# Patient Record
Sex: FEMALE | Race: WHITE | NOT HISPANIC OR LATINO
[De-identification: names, ages, dates, MRNs, and addresses within clinical notes are randomized per-mention and may not be internally consistent; named-entity substitution may affect disease eponyms.]

---
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
## 2019-02-12 ENCOUNTER — APPOINTMENT (OUTPATIENT)
Dept: SPINE | Facility: CLINIC | Age: 19
End: 2019-02-12
Payer: COMMERCIAL

## 2019-02-12 PROCEDURE — 99203 OFFICE O/P NEW LOW 30 MIN: CPT

## 2019-02-15 VITALS — HEIGHT: 63 IN | WEIGHT: 120 LBS | BODY MASS INDEX: 21.26 KG/M2

## 2019-02-15 NOTE — REASON FOR VISIT
[New Patient Visit] : a new patient visit [Parent] : parent [Referred By: _________] : Patient was referred by ANA [FreeTextEntry1] : Thoracic syrinx

## 2019-02-15 NOTE — HISTORY OF PRESENT ILLNESS
[> 3 months] : more  than 3 months [FreeTextEntry1] : Thoracic syrinx [de-identified] : This is a 18 year old female who presents with a history of back pain - mid thoracic pain- and most recently she had an MRI of the thoracic pain.  A syrinx is noted in the thoracic region.  She denies trauma.  She does not suffer from headaches.  Her extremities are reported with no weakness.

## 2019-02-15 NOTE — ASSESSMENT
[FreeTextEntry1] : Assess:\par Thoracic syrinx\par \par PLAN:\par Rule out etiology \par Brain MRI for rule out of chiari malformation \par MRI of cervical spine to rule out extended syrinx\par MRI of lumbar spine to rule out tethered cord or syrinx\par Scoliosis film of xrays\par Return after images

## 2019-02-19 ENCOUNTER — TRANSCRIPTION ENCOUNTER (OUTPATIENT)
Age: 19
End: 2019-02-19

## 2019-02-25 ENCOUNTER — RESULT REVIEW (OUTPATIENT)
Age: 19
End: 2019-02-25

## 2019-02-26 ENCOUNTER — APPOINTMENT (OUTPATIENT)
Dept: SPINE | Facility: CLINIC | Age: 19
End: 2019-02-26

## 2019-03-05 ENCOUNTER — APPOINTMENT (OUTPATIENT)
Dept: OBGYN | Facility: CLINIC | Age: 19
End: 2019-03-05
Payer: COMMERCIAL

## 2019-03-05 VITALS
HEIGHT: 63 IN | SYSTOLIC BLOOD PRESSURE: 119 MMHG | DIASTOLIC BLOOD PRESSURE: 76 MMHG | WEIGHT: 110 LBS | BODY MASS INDEX: 19.49 KG/M2

## 2019-03-05 DIAGNOSIS — N92.1 EXCESSIVE AND FREQUENT MENSTRUATION WITH IRREGULAR CYCLE: ICD-10-CM

## 2019-03-05 PROCEDURE — 99213 OFFICE O/P EST LOW 20 MIN: CPT

## 2019-03-14 ENCOUNTER — APPOINTMENT (OUTPATIENT)
Dept: OBGYN | Facility: CLINIC | Age: 19
End: 2019-03-14

## 2019-03-17 ENCOUNTER — TRANSCRIPTION ENCOUNTER (OUTPATIENT)
Age: 19
End: 2019-03-17

## 2019-03-19 ENCOUNTER — APPOINTMENT (OUTPATIENT)
Dept: RADIOLOGY | Facility: CLINIC | Age: 19
End: 2019-03-19

## 2019-03-22 ENCOUNTER — OUTPATIENT (OUTPATIENT)
Dept: OUTPATIENT SERVICES | Facility: HOSPITAL | Age: 19
LOS: 1 days | End: 2019-03-22
Payer: COMMERCIAL

## 2019-03-22 ENCOUNTER — APPOINTMENT (OUTPATIENT)
Dept: RADIOLOGY | Facility: CLINIC | Age: 19
End: 2019-03-22
Payer: COMMERCIAL

## 2019-03-22 DIAGNOSIS — G95.0 SYRINGOMYELIA AND SYRINGOBULBIA: ICD-10-CM

## 2019-03-22 PROCEDURE — 72082 X-RAY EXAM ENTIRE SPI 2/3 VW: CPT | Mod: 26

## 2019-03-22 PROCEDURE — 72082 X-RAY EXAM ENTIRE SPI 2/3 VW: CPT

## 2019-03-31 ENCOUNTER — RX RENEWAL (OUTPATIENT)
Age: 19
End: 2019-03-31

## 2019-05-07 ENCOUNTER — APPOINTMENT (OUTPATIENT)
Dept: SPINE | Facility: CLINIC | Age: 19
End: 2019-05-07
Payer: COMMERCIAL

## 2019-05-07 VITALS
BODY MASS INDEX: 22.08 KG/M2 | SYSTOLIC BLOOD PRESSURE: 107 MMHG | DIASTOLIC BLOOD PRESSURE: 70 MMHG | WEIGHT: 120 LBS | HEIGHT: 62 IN

## 2019-05-07 PROCEDURE — 99213 OFFICE O/P EST LOW 20 MIN: CPT

## 2019-05-08 NOTE — DATA REVIEWED
[de-identified] : MRI of brain, cervical and lumbar from  2/21/2019 [de-identified] : X rays Scoliosis from  2/21/2019

## 2019-05-08 NOTE — ASSESSMENT
[FreeTextEntry1] : Assess: Thoracic syrinx MRI show no signs of Chiari Malformation, syrinx does not descend into other parts of spine, and no tethered cord.  No lesions.   PLAN: Surgical options not recommended Recommend PT SCS discussed  Conservative follow up recommended and to have image in one year

## 2019-05-08 NOTE — DATA REVIEWED
[de-identified] : MRI of brain, cervical and lumbar from  2/21/2019 [de-identified] : X rays Scoliosis from  2/21/2019

## 2019-05-08 NOTE — REASON FOR VISIT
[Parent] : parent [Follow-Up: _____] : a [unfilled] follow-up visit [FreeTextEntry1] : 18 year old female with mid thoracic pain and diagnosed with a thoracic syrinx.  She has no changes since her last visit and neuroaxis images will be reviewed at today's visit.

## 2019-08-27 ENCOUNTER — APPOINTMENT (OUTPATIENT)
Dept: OBGYN | Facility: CLINIC | Age: 19
End: 2019-08-27

## 2019-09-16 ENCOUNTER — RX RENEWAL (OUTPATIENT)
Age: 19
End: 2019-09-16

## 2019-09-18 ENCOUNTER — MEDICATION RENEWAL (OUTPATIENT)
Age: 19
End: 2019-09-18

## 2020-02-12 ENCOUNTER — APPOINTMENT (OUTPATIENT)
Dept: SPINE | Facility: CLINIC | Age: 20
End: 2020-02-12
Payer: COMMERCIAL

## 2020-02-12 VITALS
BODY MASS INDEX: 22.08 KG/M2 | DIASTOLIC BLOOD PRESSURE: 76 MMHG | WEIGHT: 120 LBS | HEIGHT: 62 IN | SYSTOLIC BLOOD PRESSURE: 112 MMHG

## 2020-02-12 DIAGNOSIS — G95.0 SYRINGOMYELIA AND SYRINGOBULBIA: ICD-10-CM

## 2020-02-12 DIAGNOSIS — R35.0 FREQUENCY OF MICTURITION: ICD-10-CM

## 2020-02-12 PROCEDURE — 99213 OFFICE O/P EST LOW 20 MIN: CPT

## 2020-02-13 PROBLEM — R35.0 URINARY FREQUENCY: Status: ACTIVE | Noted: 2020-02-12

## 2020-02-13 NOTE — PHYSICAL EXAM
[General Appearance - Alert] : alert [Cranial Nerves Oculomotor (III)] : extraocular motion intact [General Appearance - In No Acute Distress] : in no acute distress [Cranial Nerves Optic (II)] : visual acuity intact bilaterally,  pupils equal round and reactive to light [Cranial Nerves Trigeminal (V)] : facial sensation intact symmetrically [Cranial Nerves Facial (VII)] : face symmetrical [Cranial Nerves Vestibulocochlear (VIII)] : hearing was intact bilaterally [Cranial Nerves Glossopharyngeal (IX)] : tongue and palate midline [Cranial Nerves Accessory (XI - Cranial And Spinal)] : head turning and shoulder shrug symmetric [Motor Tone] : muscle tone was normal in all four extremities [Cranial Nerves Hypoglossal (XII)] : there was no tongue deviation with protrusion [Motor Strength] : muscle strength was normal in all four extremities [Sensation Tactile Decrease] : light touch was intact [Involuntary Movements] : no involuntary movements were seen [Balance] : balance was intact [No Visual Abnormalities] : no visible abnormailities [Normal] : normal [Able to toe walk] : the patient was able to toe walk [Able to heel walk] : the patient was able to heel walk [Intact] : all sensory within normal limits bilaterally [Abdomen Soft] : soft [] : no respiratory distress [Skin Color & Pigmentation] : normal skin color and pigmentation [Abnormal Walk] : normal gait

## 2020-02-13 NOTE — ASSESSMENT
[FreeTextEntry1] : Follow up Referral and evaluation for Bladder issues. Need comprehensive URODYNAMICS to assess for neurogenic Bladder.\par New Thoracic MRI in one year. Explained that she may need sooner if urinary symptoms progresses.\par Education provided regarding plan of care.\par

## 2020-03-03 ENCOUNTER — APPOINTMENT (OUTPATIENT)
Dept: OBGYN | Facility: CLINIC | Age: 20
End: 2020-03-03

## 2020-06-15 ENCOUNTER — RX RENEWAL (OUTPATIENT)
Age: 20
End: 2020-06-15

## 2020-07-17 ENCOUNTER — RX RENEWAL (OUTPATIENT)
Age: 20
End: 2020-07-17

## 2021-02-15 ENCOUNTER — TRANSCRIPTION ENCOUNTER (OUTPATIENT)
Age: 21
End: 2021-02-15

## 2021-02-17 ENCOUNTER — APPOINTMENT (OUTPATIENT)
Dept: SPINE | Facility: CLINIC | Age: 21
End: 2021-02-17

## 2021-03-03 ENCOUNTER — RESULT REVIEW (OUTPATIENT)
Age: 21
End: 2021-03-03

## 2021-04-01 ENCOUNTER — NON-APPOINTMENT (OUTPATIENT)
Age: 21
End: 2021-04-01

## 2021-04-07 ENCOUNTER — APPOINTMENT (OUTPATIENT)
Dept: SPINE | Facility: CLINIC | Age: 21
End: 2021-04-07
Payer: COMMERCIAL

## 2021-04-07 DIAGNOSIS — Z34.90 ENCOUNTER FOR SUPERVISION OF NORMAL PREGNANCY, UNSPECIFIED, UNSPECIFIED TRIMESTER: ICD-10-CM

## 2021-04-16 ENCOUNTER — TRANSCRIPTION ENCOUNTER (OUTPATIENT)
Age: 21
End: 2021-04-16

## 2021-04-21 ENCOUNTER — APPOINTMENT (OUTPATIENT)
Dept: SPINE | Facility: CLINIC | Age: 21
End: 2021-04-21
Payer: COMMERCIAL

## 2021-04-21 VITALS
DIASTOLIC BLOOD PRESSURE: 66 MMHG | WEIGHT: 103 LBS | TEMPERATURE: 98.3 F | HEIGHT: 62 IN | HEART RATE: 77 BPM | BODY MASS INDEX: 18.95 KG/M2 | SYSTOLIC BLOOD PRESSURE: 118 MMHG | OXYGEN SATURATION: 96 %

## 2021-04-21 PROBLEM — Z34.90 PREGNANCY: Status: ACTIVE | Noted: 2021-04-21

## 2021-04-21 PROCEDURE — 99072 ADDL SUPL MATRL&STAF TM PHE: CPT

## 2021-04-21 PROCEDURE — 99211 OFF/OP EST MAY X REQ PHY/QHP: CPT

## 2021-04-22 NOTE — ASSESSMENT
[FreeTextEntry1] : \par 20 year old woma H/O Thoracic Syrinx\par OB Gyn  will call Dr. Fletcher to discuss\par \par MAssage therapy and acupuncture therapy as needed

## 2021-04-22 NOTE — PHYSICAL EXAM
[General Appearance - Alert] : alert [General Appearance - In No Acute Distress] : in no acute distress [Oriented To Time, Place, And Person] : oriented to person, place, and time [Cranial Nerves Optic (II)] : visual acuity intact bilaterally,  pupils equal round and reactive to light [Impaired Insight] : insight and judgment were intact [Cranial Nerves Oculomotor (III)] : extraocular motion intact [Cranial Nerves Trigeminal (V)] : facial sensation intact symmetrically [Cranial Nerves Facial (VII)] : face symmetrical [Cranial Nerves Vestibulocochlear (VIII)] : hearing was intact bilaterally [Cranial Nerves Glossopharyngeal (IX)] : tongue and palate midline [Cranial Nerves Accessory (XI - Cranial And Spinal)] : head turning and shoulder shrug symmetric [Cranial Nerves Hypoglossal (XII)] : there was no tongue deviation with protrusion [Sclera] : the sclera and conjunctiva were normal [Outer Ear] : the ears and nose were normal in appearance [Neck Appearance] : the appearance of the neck was normal [] : no respiratory distress [Heart Rate And Rhythm] : heart rate was normal and rhythm regular [Abdomen Soft] : soft [Abnormal Walk] : normal gait [Skin Color & Pigmentation] : normal skin color and pigmentation

## 2021-04-22 NOTE — END OF VISIT
[FreeTextEntry3] : I, Dr. Chuck Fletcher, evaluated the patient with the nurse practitioner Toby Howard and established the plan of care. I personally discuss this patient with the nurse practitioner at the time of the visit. I agree with the assessment and plan as written, unless noted below.\par \par

## 2021-07-01 ENCOUNTER — APPOINTMENT (OUTPATIENT)
Dept: ULTRASOUND IMAGING | Facility: CLINIC | Age: 21
End: 2021-07-01
Payer: COMMERCIAL

## 2021-07-01 PROCEDURE — 76700 US EXAM ABDOM COMPLETE: CPT

## 2021-09-13 ENCOUNTER — APPOINTMENT (OUTPATIENT)
Dept: CARDIOLOGY | Facility: CLINIC | Age: 21
End: 2021-09-13
Payer: COMMERCIAL

## 2021-09-13 ENCOUNTER — NON-APPOINTMENT (OUTPATIENT)
Age: 21
End: 2021-09-13

## 2021-09-13 VITALS
BODY MASS INDEX: 24.29 KG/M2 | HEIGHT: 62 IN | WEIGHT: 132 LBS | HEART RATE: 94 BPM | DIASTOLIC BLOOD PRESSURE: 64 MMHG | SYSTOLIC BLOOD PRESSURE: 103 MMHG | OXYGEN SATURATION: 97 %

## 2021-09-13 DIAGNOSIS — Z82.49 FAMILY HISTORY OF ISCHEMIC HEART DISEASE AND OTHER DISEASES OF THE CIRCULATORY SYSTEM: ICD-10-CM

## 2021-09-13 DIAGNOSIS — O99.019 ANEMIA COMPLICATING PREGNANCY, UNSPECIFIED TRIMESTER: ICD-10-CM

## 2021-09-13 DIAGNOSIS — E03.9 HYPOTHYROIDISM, UNSPECIFIED: ICD-10-CM

## 2021-09-13 PROCEDURE — 93000 ELECTROCARDIOGRAM COMPLETE: CPT

## 2021-09-13 PROCEDURE — 99204 OFFICE O/P NEW MOD 45 MIN: CPT

## 2021-09-13 RX ORDER — LEVONORGESTREL AND ETHINYL ESTRADIOL AND ETHINYL ESTRADIOL 150-30(84)
0.15-0.03 &0.01 KIT ORAL DAILY
Qty: 91 | Refills: 0 | Status: DISCONTINUED | COMMUNITY
Start: 2018-07-17 | End: 2021-09-13

## 2021-09-13 RX ORDER — NORETHINDRONE ACETATE/ETHINYL ESTRADIOL AND FERROUS FUMARATE 1MG-20(21)
1-20 KIT ORAL
Qty: 4 | Refills: 2 | Status: DISCONTINUED | COMMUNITY
Start: 2019-03-05 | End: 2021-09-13

## 2021-09-14 NOTE — ASSESSMENT
[FreeTextEntry1] : 21 yo woman - 36 weeks pregnanct - sent urgently from Dr Aguirre with c/o feeling tored , sob , lightheaded and having swollen legs.\par \par SPoke w Dr Aguirre - she saw her just this afternoon - not concerned about the peripheral edema - believes this is appropriate for gestational age.\par \par physical exam did not reveal anything concerning for pericardial effusion , HF or arrhythmia. BP HR and O2 sats all nl.\par \par Will scheduled echo tomorrow ( before Jain holidays) . \par \par \par Spoke about increasing fluid and po intake.\par

## 2021-09-14 NOTE — PHYSICAL EXAM
[Rhythm Regular] : regular [Normal S1] : normal S1 [Normal S2] : normal S2 [Normal S1, S2] : normal S1, S2 [Edema ___] : edema [unfilled] [Normal] : alert and oriented, normal memory [de-identified] : 36 weeks gestation [de-identified] : soft HSM [de-identified] : gravid uterus

## 2021-09-14 NOTE — HISTORY OF PRESENT ILLNESS
[FreeTextEntry1] : 20 year old female with family history of hypertension and hyperlipidemia. Patient is currently 36 weeks pregnant.\par \par DUE DATE: October 11, 2021\par LMP:           January 4, ,2021\par \par Of note, patient had the COVID-19 virus in August of 2020. She was not hospitalized, symptoms included:\par cold symptoms , loss of smell and taste. \par \par Followed by a Mononucleosis illness two months later.with high fevers, fatigue and body aches. \par \par Last Thursday, September 9, 2021, patient began feeling dizzy, lightheaded with associated complaints of chest tightness.\par She was advised to undergo a repeat COVID-19 PCR test, which was negative. \par \par Of note, patient has anemia in pregnancy and underwent an IV iron infusion last week with a reported Hemoglobin of 10. She reports that she has had 5 infusion session since the start of her pregnancy. \par \par Blood pressure today is normotensive. EKG demonstrates sinus rhythm.

## 2021-09-15 ENCOUNTER — OUTPATIENT (OUTPATIENT)
Dept: OUTPATIENT SERVICES | Facility: HOSPITAL | Age: 21
LOS: 1 days | End: 2021-09-15
Payer: COMMERCIAL

## 2021-09-15 VITALS — TEMPERATURE: 98 F

## 2021-09-15 VITALS
OXYGEN SATURATION: 100 % | RESPIRATION RATE: 14 BRPM | DIASTOLIC BLOOD PRESSURE: 75 MMHG | TEMPERATURE: 98 F | SYSTOLIC BLOOD PRESSURE: 109 MMHG | HEART RATE: 95 BPM

## 2021-09-15 DIAGNOSIS — O26.899 OTHER SPECIFIED PREGNANCY RELATED CONDITIONS, UNSPECIFIED TRIMESTER: ICD-10-CM

## 2021-09-15 DIAGNOSIS — Z3A.00 WEEKS OF GESTATION OF PREGNANCY NOT SPECIFIED: ICD-10-CM

## 2021-09-15 LAB
ALBUMIN SERPL ELPH-MCNC: 3.6 G/DL — SIGNIFICANT CHANGE UP (ref 3.3–5)
ALP SERPL-CCNC: 107 U/L — SIGNIFICANT CHANGE UP (ref 40–120)
ALT FLD-CCNC: 22 U/L — SIGNIFICANT CHANGE UP (ref 10–45)
ANION GAP SERPL CALC-SCNC: 12 MMOL/L — SIGNIFICANT CHANGE UP (ref 5–17)
APPEARANCE UR: CLEAR — SIGNIFICANT CHANGE UP
APTT BLD: 27.3 SEC — LOW (ref 27.5–35.5)
AST SERPL-CCNC: 25 U/L — SIGNIFICANT CHANGE UP (ref 10–40)
BASOPHILS # BLD AUTO: 0.04 K/UL — SIGNIFICANT CHANGE UP (ref 0–0.2)
BASOPHILS NFR BLD AUTO: 0.3 % — SIGNIFICANT CHANGE UP (ref 0–2)
BILIRUB SERPL-MCNC: 0.2 MG/DL — SIGNIFICANT CHANGE UP (ref 0.2–1.2)
BILIRUB UR-MCNC: NEGATIVE — SIGNIFICANT CHANGE UP
BUN SERPL-MCNC: 7 MG/DL — SIGNIFICANT CHANGE UP (ref 7–23)
CALCIUM SERPL-MCNC: 9.4 MG/DL — SIGNIFICANT CHANGE UP (ref 8.4–10.5)
CHLORIDE SERPL-SCNC: 103 MMOL/L — SIGNIFICANT CHANGE UP (ref 96–108)
CO2 SERPL-SCNC: 20 MMOL/L — LOW (ref 22–31)
COLOR SPEC: COLORLESS — SIGNIFICANT CHANGE UP
CREAT ?TM UR-MCNC: 8 MG/DL — SIGNIFICANT CHANGE UP
CREAT SERPL-MCNC: 0.36 MG/DL — LOW (ref 0.5–1.3)
DIFF PNL FLD: NEGATIVE — SIGNIFICANT CHANGE UP
EOSINOPHIL # BLD AUTO: 0.13 K/UL — SIGNIFICANT CHANGE UP (ref 0–0.5)
EOSINOPHIL NFR BLD AUTO: 1.1 % — SIGNIFICANT CHANGE UP (ref 0–6)
FIBRINOGEN PPP-MCNC: 641 MG/DL — HIGH (ref 290–520)
GLUCOSE SERPL-MCNC: 109 MG/DL — HIGH (ref 70–99)
GLUCOSE UR QL: NEGATIVE — SIGNIFICANT CHANGE UP
HCT VFR BLD CALC: 30.3 % — LOW (ref 34.5–45)
HGB BLD-MCNC: 9.8 G/DL — LOW (ref 11.5–15.5)
IMM GRANULOCYTES NFR BLD AUTO: 2.5 % — HIGH (ref 0–1.5)
INR BLD: 1.04 RATIO — SIGNIFICANT CHANGE UP (ref 0.88–1.16)
KETONES UR-MCNC: NEGATIVE — SIGNIFICANT CHANGE UP
LDH SERPL L TO P-CCNC: 154 U/L — SIGNIFICANT CHANGE UP (ref 50–242)
LEUKOCYTE ESTERASE UR-ACNC: NEGATIVE — SIGNIFICANT CHANGE UP
LYMPHOCYTES # BLD AUTO: 1.81 K/UL — SIGNIFICANT CHANGE UP (ref 1–3.3)
LYMPHOCYTES # BLD AUTO: 14.7 % — SIGNIFICANT CHANGE UP (ref 13–44)
MCHC RBC-ENTMCNC: 28.3 PG — SIGNIFICANT CHANGE UP (ref 27–34)
MCHC RBC-ENTMCNC: 32.3 GM/DL — SIGNIFICANT CHANGE UP (ref 32–36)
MCV RBC AUTO: 87.6 FL — SIGNIFICANT CHANGE UP (ref 80–100)
MONOCYTES # BLD AUTO: 1.17 K/UL — HIGH (ref 0–0.9)
MONOCYTES NFR BLD AUTO: 9.5 % — SIGNIFICANT CHANGE UP (ref 2–14)
NEUTROPHILS # BLD AUTO: 8.88 K/UL — HIGH (ref 1.8–7.4)
NEUTROPHILS NFR BLD AUTO: 71.9 % — SIGNIFICANT CHANGE UP (ref 43–77)
NITRITE UR-MCNC: NEGATIVE — SIGNIFICANT CHANGE UP
NRBC # BLD: 0 /100 WBCS — SIGNIFICANT CHANGE UP (ref 0–0)
PH UR: 7 — SIGNIFICANT CHANGE UP (ref 5–8)
PLATELET # BLD AUTO: 185 K/UL — SIGNIFICANT CHANGE UP (ref 150–400)
POTASSIUM SERPL-MCNC: 3.6 MMOL/L — SIGNIFICANT CHANGE UP (ref 3.5–5.3)
POTASSIUM SERPL-SCNC: 3.6 MMOL/L — SIGNIFICANT CHANGE UP (ref 3.5–5.3)
PROT ?TM UR-MCNC: <4 MG/DL — SIGNIFICANT CHANGE UP (ref 0–12)
PROT SERPL-MCNC: 6 G/DL — SIGNIFICANT CHANGE UP (ref 6–8.3)
PROT UR-MCNC: NEGATIVE — SIGNIFICANT CHANGE UP
PROT/CREAT UR-RTO: <0.5 RATIO — HIGH (ref 0–0.2)
PROTHROM AB SERPL-ACNC: 12.4 SEC — SIGNIFICANT CHANGE UP (ref 10.6–13.6)
RBC # BLD: 3.46 M/UL — LOW (ref 3.8–5.2)
RBC # FLD: 15.1 % — HIGH (ref 10.3–14.5)
SODIUM SERPL-SCNC: 135 MMOL/L — SIGNIFICANT CHANGE UP (ref 135–145)
SP GR SPEC: 1 — LOW (ref 1.01–1.02)
URATE SERPL-MCNC: 3 MG/DL — SIGNIFICANT CHANGE UP (ref 2.5–7)
UROBILINOGEN FLD QL: NEGATIVE — SIGNIFICANT CHANGE UP
WBC # BLD: 12.34 K/UL — HIGH (ref 3.8–10.5)
WBC # FLD AUTO: 12.34 K/UL — HIGH (ref 3.8–10.5)

## 2021-09-15 PROCEDURE — 85730 THROMBOPLASTIN TIME PARTIAL: CPT

## 2021-09-15 PROCEDURE — G0463: CPT

## 2021-09-15 PROCEDURE — 80053 COMPREHEN METABOLIC PANEL: CPT

## 2021-09-15 PROCEDURE — 85610 PROTHROMBIN TIME: CPT

## 2021-09-15 PROCEDURE — 81003 URINALYSIS AUTO W/O SCOPE: CPT

## 2021-09-15 PROCEDURE — 83615 LACTATE (LD) (LDH) ENZYME: CPT

## 2021-09-15 PROCEDURE — 84550 ASSAY OF BLOOD/URIC ACID: CPT

## 2021-09-15 PROCEDURE — 85384 FIBRINOGEN ACTIVITY: CPT

## 2021-09-15 PROCEDURE — 82570 ASSAY OF URINE CREATININE: CPT

## 2021-09-15 PROCEDURE — 84156 ASSAY OF PROTEIN URINE: CPT

## 2021-09-15 PROCEDURE — 85025 COMPLETE CBC W/AUTO DIFF WBC: CPT

## 2021-09-15 PROCEDURE — 59025 FETAL NON-STRESS TEST: CPT

## 2021-09-15 NOTE — OB PROVIDER TRIAGE NOTE - NSOBPROVIDERNOTE_OBGYN_ALL_OB_FT
A/P: 19yo  @ 36.2 weeks r/o PEC  - HELLP labs  - Monitor BP  - EFM/TOCO    will dw Dr Florentino Pugh PAC A/P: 21yo  @ 36.2 weeks r/o PEC  - HELLP labs  - Monitor BP  - EFM/TOCO    will [place] Pugh PAC    PA Addendum  BPs normotensive, HELLP labs WNL, P/C unable to calculate 2/2 low protein     A/P: Discharge home, return if elevated BP, HA, visual changes, epigastric pain or worsening edema. Follow up as scheduled.     [place]and Pugh PAC

## 2021-09-15 NOTE — OB PROVIDER TRIAGE NOTE - NSHPLABSRESULTS_GEN_ALL_CORE
CBC Full  -  ( 15 Sep 2021 19:43 )  WBC Count : 12.34 K/uL  RBC Count : 3.46 M/uL  Hemoglobin : 9.8 g/dL  Hematocrit : 30.3 %  Platelet Count - Automated : 185 K/uL  Mean Cell Volume : 87.6 fl  Mean Cell Hemoglobin : 28.3 pg  Mean Cell Hemoglobin Concentration : 32.3 gm/dL  Auto Neutrophil # : 8.88 K/uL  Auto Lymphocyte # : 1.81 K/uL  Auto Monocyte # : 1.17 K/uL  Auto Eosinophil # : 0.13 K/uL  Auto Basophil # : 0.04 K/uL  Auto Neutrophil % : 71.9 %  Auto Lymphocyte % : 14.7 %  Auto Monocyte % : 9.5 %  Auto Eosinophil % : 1.1 %  Auto Basophil % : 0.3 %    09-15-21 @ 19:43    135  |  103  |  7             --------------------------< 109<H>     3.6  |  20<L>  | 0.36<L>    eGFR AA: 179  eGFR N-AA: 154    Calcium: 9.4  Phosphorus: --  Magnesium: --    AST: 25    ALT: 22  AlkPhos: 107  Protein: 6.0  Albumin: 3.6  TBili: 0.2  D-Bili: --    Urinalysis Basic - ( 15 Sep 2021 20:17 )    Color: Colorless / Appearance: Clear / S.004 / pH: x  Gluc: x / Ketone: Negative  / Bili: Negative / Urobili: Negative   Blood: x / Protein: Negative / Nitrite: Negative   Leuk Esterase: Negative / RBC: x / WBC x   Sq Epi: x / Non Sq Epi: x / Bacteria: x

## 2021-09-15 NOTE — OB PROVIDER TRIAGE NOTE - NSHPPHYSICALEXAM_GEN_ALL_CORE
PE:  T(C): 36.9 (09-15-21 @ 19:26), Max: 36.9 (09-15-21 @ 19:21)  HR: 87 (09-15-21 @ 19:59) (86 - 99)  BP: 108/60 (09-15-21 @ 19:59) (108/60 - 113/63)  RR: 14 (09-15-21 @ 19:21) (14 - 14)  SpO2: 96% (09-15-21 @ 19:57) (94% - 100%)  General: NAD, A&Ox3  CV: RRR  Lungs: Clear bilat   Abd: soft, nontender, gravid, no epigastric tenderness  VE: deferred  Extremities: bilateral lower extremity edema  EFM: 135/moderate variablity/+accels/-decels  TOCO: irregular

## 2021-09-15 NOTE — OB PROVIDER TRIAGE NOTE - HISTORY OF PRESENT ILLNESS
21yo  @ 36.2 weeks (OK 10/11) presents from home after an elevated BP reading at home in the "140s/90s". Pt denies HA, visual changes, or epigastric pain. Pt does endorse lower extremity edema that has worsened over the past 2 days. Pt also states that last week she was feeling faint and had cheat pressure so she went to her hematologist for a possible iron infusion secondary to anemia but her H/H did not warrant infusion. Additionally pt saw a cardiologist on Monday for her chest pressure and workup was negative. +FM, -LOF, -VB, -CTX    OBHx: None  GYNHx: No ovarian cysts, fibroids, hx of abnormal pap or STDs  PMHx: Hypothyroid on synthroid, anemia requiring iron infusions, syringomyelia. No hx of DM, HTN, asthma, bleeding or clotting disorders or blood transfusions.  PSurgHx: None  Allergies: NKDA, citrus, kiwi, pineapple-->angioedema  Meds: PNV, synthroid 75mcg  Social: No smoking, alcohol, or illicit drug use during pregnancy   Psych: No hx of anxiety or depression

## 2021-09-16 LAB — PROT ?TM UR-MCNC: <4 MG/DL — SIGNIFICANT CHANGE UP (ref 0–12)

## 2021-09-20 ENCOUNTER — APPOINTMENT (OUTPATIENT)
Dept: CARDIOLOGY | Facility: CLINIC | Age: 21
End: 2021-09-20

## 2021-10-19 ENCOUNTER — INPATIENT (INPATIENT)
Facility: HOSPITAL | Age: 21
LOS: 1 days | Discharge: HOME | End: 2021-10-21
Attending: OBSTETRICS & GYNECOLOGY | Admitting: OBSTETRICS & GYNECOLOGY
Payer: COMMERCIAL

## 2021-10-19 VITALS — OXYGEN SATURATION: 99 % | HEART RATE: 80 BPM

## 2021-10-19 DIAGNOSIS — Z34.80 ENCOUNTER FOR SUPERVISION OF OTHER NORMAL PREGNANCY, UNSPECIFIED TRIMESTER: ICD-10-CM

## 2021-10-19 DIAGNOSIS — O26.899 OTHER SPECIFIED PREGNANCY RELATED CONDITIONS, UNSPECIFIED TRIMESTER: ICD-10-CM

## 2021-10-19 DIAGNOSIS — Z3A.00 WEEKS OF GESTATION OF PREGNANCY NOT SPECIFIED: ICD-10-CM

## 2021-10-19 PROBLEM — O99.019 ANEMIA COMPLICATING PREGNANCY, UNSPECIFIED TRIMESTER: Chronic | Status: ACTIVE | Noted: 2021-09-15

## 2021-10-19 PROBLEM — D23.9 OTHER BENIGN NEOPLASM OF SKIN, UNSPECIFIED: Chronic | Status: ACTIVE | Noted: 2021-09-15

## 2021-10-19 PROBLEM — E03.9 HYPOTHYROIDISM, UNSPECIFIED: Chronic | Status: ACTIVE | Noted: 2021-09-15

## 2021-10-19 LAB
BASOPHILS # BLD AUTO: 0.04 K/UL — SIGNIFICANT CHANGE UP (ref 0–0.2)
BASOPHILS NFR BLD AUTO: 0.3 % — SIGNIFICANT CHANGE UP (ref 0–2)
COVID-19 SPIKE DOMAIN AB INTERP: POSITIVE
COVID-19 SPIKE DOMAIN ANTIBODY RESULT: 90 U/ML — HIGH
EOSINOPHIL # BLD AUTO: 0.16 K/UL — SIGNIFICANT CHANGE UP (ref 0–0.5)
EOSINOPHIL NFR BLD AUTO: 1.1 % — SIGNIFICANT CHANGE UP (ref 0–6)
HCT VFR BLD CALC: 34.6 % — SIGNIFICANT CHANGE UP (ref 34.5–45)
HGB BLD-MCNC: 11.2 G/DL — LOW (ref 11.5–15.5)
IMM GRANULOCYTES NFR BLD AUTO: 1.1 % — SIGNIFICANT CHANGE UP (ref 0–1.5)
LYMPHOCYTES # BLD AUTO: 16.4 % — SIGNIFICANT CHANGE UP (ref 13–44)
LYMPHOCYTES # BLD AUTO: 2.43 K/UL — SIGNIFICANT CHANGE UP (ref 1–3.3)
MCHC RBC-ENTMCNC: 27.7 PG — SIGNIFICANT CHANGE UP (ref 27–34)
MCHC RBC-ENTMCNC: 32.4 GM/DL — SIGNIFICANT CHANGE UP (ref 32–36)
MCV RBC AUTO: 85.6 FL — SIGNIFICANT CHANGE UP (ref 80–100)
MONOCYTES # BLD AUTO: 0.99 K/UL — HIGH (ref 0–0.9)
MONOCYTES NFR BLD AUTO: 6.7 % — SIGNIFICANT CHANGE UP (ref 2–14)
NEUTROPHILS # BLD AUTO: 11.04 K/UL — HIGH (ref 1.8–7.4)
NEUTROPHILS NFR BLD AUTO: 74.4 % — SIGNIFICANT CHANGE UP (ref 43–77)
NRBC # BLD: 0 /100 WBCS — SIGNIFICANT CHANGE UP (ref 0–0)
PLATELET # BLD AUTO: 211 K/UL — SIGNIFICANT CHANGE UP (ref 150–400)
RBC # BLD: 4.04 M/UL — SIGNIFICANT CHANGE UP (ref 3.8–5.2)
RBC # FLD: 15 % — HIGH (ref 10.3–14.5)
SARS-COV-2 IGG+IGM SERPL QL IA: 90 U/ML — HIGH
SARS-COV-2 IGG+IGM SERPL QL IA: POSITIVE
SARS-COV-2 RNA SPEC QL NAA+PROBE: SIGNIFICANT CHANGE UP
T PALLIDUM AB TITR SER: NEGATIVE — SIGNIFICANT CHANGE UP
WBC # BLD: 14.82 K/UL — HIGH (ref 3.8–10.5)
WBC # FLD AUTO: 14.82 K/UL — HIGH (ref 3.8–10.5)

## 2021-10-19 RX ORDER — DIBUCAINE 1 %
1 OINTMENT (GRAM) RECTAL EVERY 6 HOURS
Refills: 0 | Status: DISCONTINUED | OUTPATIENT
Start: 2021-10-19 | End: 2021-10-21

## 2021-10-19 RX ORDER — DIPHENHYDRAMINE HCL 50 MG
25 CAPSULE ORAL EVERY 6 HOURS
Refills: 0 | Status: DISCONTINUED | OUTPATIENT
Start: 2021-10-19 | End: 2021-10-21

## 2021-10-19 RX ORDER — PRAMOXINE HYDROCHLORIDE 150 MG/15G
1 AEROSOL, FOAM RECTAL EVERY 4 HOURS
Refills: 0 | Status: DISCONTINUED | OUTPATIENT
Start: 2021-10-19 | End: 2021-10-21

## 2021-10-19 RX ORDER — OXYCODONE HYDROCHLORIDE 5 MG/1
5 TABLET ORAL ONCE
Refills: 0 | Status: DISCONTINUED | OUTPATIENT
Start: 2021-10-19 | End: 2021-10-21

## 2021-10-19 RX ORDER — TETANUS TOXOID, REDUCED DIPHTHERIA TOXOID AND ACELLULAR PERTUSSIS VACCINE, ADSORBED 5; 2.5; 8; 8; 2.5 [IU]/.5ML; [IU]/.5ML; UG/.5ML; UG/.5ML; UG/.5ML
0.5 SUSPENSION INTRAMUSCULAR ONCE
Refills: 0 | Status: DISCONTINUED | OUTPATIENT
Start: 2021-10-19 | End: 2021-10-21

## 2021-10-19 RX ORDER — LANOLIN
1 OINTMENT (GRAM) TOPICAL EVERY 6 HOURS
Refills: 0 | Status: DISCONTINUED | OUTPATIENT
Start: 2021-10-19 | End: 2021-10-21

## 2021-10-19 RX ORDER — SODIUM CHLORIDE 9 MG/ML
1000 INJECTION, SOLUTION INTRAVENOUS
Refills: 0 | Status: DISCONTINUED | OUTPATIENT
Start: 2021-10-19 | End: 2021-10-19

## 2021-10-19 RX ORDER — OXYTOCIN 10 UNIT/ML
333.33 VIAL (ML) INJECTION
Qty: 20 | Refills: 0 | Status: COMPLETED | OUTPATIENT
Start: 2021-10-19 | End: 2021-10-19

## 2021-10-19 RX ORDER — IBUPROFEN 200 MG
600 TABLET ORAL EVERY 6 HOURS
Refills: 0 | Status: COMPLETED | OUTPATIENT
Start: 2021-10-19 | End: 2022-09-17

## 2021-10-19 RX ORDER — IBUPROFEN 200 MG
600 TABLET ORAL EVERY 6 HOURS
Refills: 0 | Status: DISCONTINUED | OUTPATIENT
Start: 2021-10-19 | End: 2021-10-21

## 2021-10-19 RX ORDER — MAGNESIUM HYDROXIDE 400 MG/1
30 TABLET, CHEWABLE ORAL
Refills: 0 | Status: DISCONTINUED | OUTPATIENT
Start: 2021-10-19 | End: 2021-10-21

## 2021-10-19 RX ORDER — KETOROLAC TROMETHAMINE 30 MG/ML
30 SYRINGE (ML) INJECTION ONCE
Refills: 0 | Status: DISCONTINUED | OUTPATIENT
Start: 2021-10-19 | End: 2021-10-19

## 2021-10-19 RX ORDER — OXYCODONE HYDROCHLORIDE 5 MG/1
5 TABLET ORAL
Refills: 0 | Status: DISCONTINUED | OUTPATIENT
Start: 2021-10-19 | End: 2021-10-21

## 2021-10-19 RX ORDER — AER TRAVELER 0.5 G/1
1 SOLUTION RECTAL; TOPICAL EVERY 4 HOURS
Refills: 0 | Status: DISCONTINUED | OUTPATIENT
Start: 2021-10-19 | End: 2021-10-21

## 2021-10-19 RX ORDER — ACETAMINOPHEN 500 MG
975 TABLET ORAL
Refills: 0 | Status: DISCONTINUED | OUTPATIENT
Start: 2021-10-19 | End: 2021-10-21

## 2021-10-19 RX ORDER — OXYTOCIN 10 UNIT/ML
333.33 VIAL (ML) INJECTION
Qty: 20 | Refills: 0 | Status: DISCONTINUED | OUTPATIENT
Start: 2021-10-19 | End: 2021-10-21

## 2021-10-19 RX ORDER — CITRIC ACID/SODIUM CITRATE 300-500 MG
15 SOLUTION, ORAL ORAL EVERY 6 HOURS
Refills: 0 | Status: DISCONTINUED | OUTPATIENT
Start: 2021-10-19 | End: 2021-10-19

## 2021-10-19 RX ORDER — SIMETHICONE 80 MG/1
80 TABLET, CHEWABLE ORAL EVERY 4 HOURS
Refills: 0 | Status: DISCONTINUED | OUTPATIENT
Start: 2021-10-19 | End: 2021-10-21

## 2021-10-19 RX ORDER — BENZOCAINE 10 %
1 GEL (GRAM) MUCOUS MEMBRANE EVERY 6 HOURS
Refills: 0 | Status: DISCONTINUED | OUTPATIENT
Start: 2021-10-19 | End: 2021-10-21

## 2021-10-19 RX ORDER — HYDROCORTISONE 1 %
1 OINTMENT (GRAM) TOPICAL EVERY 6 HOURS
Refills: 0 | Status: DISCONTINUED | OUTPATIENT
Start: 2021-10-19 | End: 2021-10-21

## 2021-10-19 RX ORDER — SODIUM CHLORIDE 9 MG/ML
3 INJECTION INTRAMUSCULAR; INTRAVENOUS; SUBCUTANEOUS EVERY 8 HOURS
Refills: 0 | Status: DISCONTINUED | OUTPATIENT
Start: 2021-10-19 | End: 2021-10-21

## 2021-10-19 RX ADMIN — Medication 600 MILLIGRAM(S): at 21:42

## 2021-10-19 RX ADMIN — Medication 975 MILLIGRAM(S): at 18:21

## 2021-10-19 RX ADMIN — SODIUM CHLORIDE 3 MILLILITER(S): 9 INJECTION INTRAMUSCULAR; INTRAVENOUS; SUBCUTANEOUS at 22:33

## 2021-10-19 RX ADMIN — Medication 30 MILLIGRAM(S): at 16:39

## 2021-10-19 RX ADMIN — Medication 600 MILLIGRAM(S): at 22:33

## 2021-10-19 RX ADMIN — Medication 30 MILLIGRAM(S): at 15:32

## 2021-10-19 RX ADMIN — Medication 1000 MILLIUNIT(S)/MIN: at 13:50

## 2021-10-19 NOTE — OB PROVIDER DELIVERY SUMMARY - NSPROVIDERDELIVERYNOTE_OBGYN_ALL_OB_FT
, pt delivered of a viable female infant apgar 9/9, placenta copmplete, uterus explored. small second degree lacetaion. ebl 250 cc.

## 2021-10-19 NOTE — OB PROVIDER H&P - NSHPPHYSICALEXAM_GEN_ALL_CORE
Vital Signs Last 24 Hrs  T(C): --  T(F): --  HR: 84 (19 Oct 2021 05:09) (77 - 84)  BP: 117/73 (19 Oct 2021 04:59) (117/73 - 117/73)  BP(mean): --  RR: --  SpO2: 92% (19 Oct 2021 05:09) (92% - 99%)    General: Uncomfortable with ctx's, A&Ox3  CV: RRR  Lungs: CTA b/l  Abdomen: Soft, NT, gravid

## 2021-10-19 NOTE — OB RN DELIVERY SUMMARY - NS_SEPSISRSKCALC_OBGYN_ALL_OB_FT
EOS calculated successfully. EOS Risk Factor: 0.5/1000 live births ([facility] national incidence); GA=41w1d; Temp=97.7; ROM=0.283; GBS='Negative'; Antibiotics='No antibiotics or any antibiotics < 2 hrs prior to birth'

## 2021-10-19 NOTE — OB PROVIDER H&P - ASSESSMENT
A/P:  21y  @41wks and 1 day gestation presenting in early labor. +FM. GBS -. EFW 3200g  -Admit to L&D  -Routine labs  -EFM/[place]  -NPO, IVF, Bicitra  -Expectant mgmt  -Anesthesia consult, for epidural  -Anticipate   D/w Dr. Good who saw and evaluated pt at bedside  Kerry Elliott PA-C

## 2021-10-19 NOTE — OB PROVIDER H&P - HISTORY OF PRESENT ILLNESS
PA Note:  21y  @41wks and 1 day gestation presenting with regular, painful contractions. She denies LOF or VB. PNC uncomplicated. +FM. GBS -. EFW 3200g.    POBHx: Denies  PGYNHx: Denies fibroids, ovarian cysts, abnormal pap smears, STD's  PMHx: Hypothyroid, Anemia requiring iron transfusions, syringomyelia  Medications: PNV, Synthroid 75mcg daily  Allergies: NKDA, citrus/kiwi/pineapple->angioedema  PSHx: Denies  Social Hx: Denies etoh/tobacco/drug use. Denies anxiety/depression    Vital Signs Last 24 Hrs  T(C): --  T(F): --  HR: 84 (19 Oct 2021 05:09) (77 - 84)  BP: 117/73 (19 Oct 2021 04:59) (117/73 - 117/73)  BP(mean): --  RR: --  SpO2: 92% (19 Oct 2021 05:09) (92% - 99%)    General: Uncomfortable with ctx's, A&Ox3  CV: RRR  Lungs: CTA b/l  Abdomen: Soft, NT, gravid    VE: 2/80/-3 (examined by Dr. Good)  EFM: 130/moderate variability/+accels/no decels  [place]: q3-4m

## 2021-10-19 NOTE — OB RN DELIVERY SUMMARY - NSSELHIDDEN_OBGYN_ALL_OB_FT
[NS_DeliveryAttending1_OBGYN_ALL_OB_FT:[email or web address]=],[NS_DeliveryRN_OBGYN_ALL_OB_FT:[id number]==]

## 2021-10-20 RX ORDER — LEVOTHYROXINE SODIUM 125 MCG
75 TABLET ORAL DAILY
Refills: 0 | Status: DISCONTINUED | OUTPATIENT
Start: 2021-10-20 | End: 2021-10-21

## 2021-10-20 RX ADMIN — Medication 1 TABLET(S): at 11:52

## 2021-10-20 RX ADMIN — Medication 600 MILLIGRAM(S): at 03:15

## 2021-10-20 RX ADMIN — Medication 975 MILLIGRAM(S): at 06:53

## 2021-10-20 RX ADMIN — Medication 975 MILLIGRAM(S): at 12:22

## 2021-10-20 RX ADMIN — Medication 975 MILLIGRAM(S): at 17:25

## 2021-10-20 RX ADMIN — Medication 975 MILLIGRAM(S): at 23:28

## 2021-10-20 RX ADMIN — Medication 975 MILLIGRAM(S): at 01:20

## 2021-10-20 RX ADMIN — Medication 975 MILLIGRAM(S): at 11:52

## 2021-10-20 RX ADMIN — SODIUM CHLORIDE 3 MILLILITER(S): 9 INJECTION INTRAMUSCULAR; INTRAVENOUS; SUBCUTANEOUS at 06:53

## 2021-10-20 RX ADMIN — Medication 600 MILLIGRAM(S): at 21:24

## 2021-10-20 RX ADMIN — Medication 975 MILLIGRAM(S): at 00:46

## 2021-10-20 RX ADMIN — Medication 75 MICROGRAM(S): at 06:12

## 2021-10-20 RX ADMIN — Medication 600 MILLIGRAM(S): at 15:20

## 2021-10-20 RX ADMIN — Medication 600 MILLIGRAM(S): at 08:50

## 2021-10-20 RX ADMIN — Medication 600 MILLIGRAM(S): at 09:20

## 2021-10-20 RX ADMIN — Medication 975 MILLIGRAM(S): at 06:12

## 2021-10-20 RX ADMIN — Medication 600 MILLIGRAM(S): at 02:38

## 2021-10-20 RX ADMIN — Medication 600 MILLIGRAM(S): at 21:54

## 2021-10-20 RX ADMIN — Medication 600 MILLIGRAM(S): at 14:48

## 2021-10-21 ENCOUNTER — TRANSCRIPTION ENCOUNTER (OUTPATIENT)
Age: 21
End: 2021-10-21

## 2021-10-21 VITALS
HEART RATE: 76 BPM | SYSTOLIC BLOOD PRESSURE: 119 MMHG | DIASTOLIC BLOOD PRESSURE: 76 MMHG | RESPIRATION RATE: 18 BRPM | OXYGEN SATURATION: 99 % | TEMPERATURE: 98 F

## 2021-10-21 PROCEDURE — 59050 FETAL MONITOR W/REPORT: CPT

## 2021-10-21 PROCEDURE — 86769 SARS-COV-2 COVID-19 ANTIBODY: CPT

## 2021-10-21 PROCEDURE — 86900 BLOOD TYPING SEROLOGIC ABO: CPT

## 2021-10-21 PROCEDURE — 86780 TREPONEMA PALLIDUM: CPT

## 2021-10-21 PROCEDURE — G0463: CPT

## 2021-10-21 PROCEDURE — 85025 COMPLETE CBC W/AUTO DIFF WBC: CPT

## 2021-10-21 PROCEDURE — 59025 FETAL NON-STRESS TEST: CPT

## 2021-10-21 PROCEDURE — 86901 BLOOD TYPING SEROLOGIC RH(D): CPT

## 2021-10-21 PROCEDURE — 86850 RBC ANTIBODY SCREEN: CPT

## 2021-10-21 PROCEDURE — 87635 SARS-COV-2 COVID-19 AMP PRB: CPT

## 2021-10-21 RX ORDER — IBUPROFEN 200 MG
1 TABLET ORAL
Qty: 0 | Refills: 0 | DISCHARGE
Start: 2021-10-21

## 2021-10-21 RX ORDER — LEVOTHYROXINE SODIUM 125 MCG
1 TABLET ORAL
Qty: 0 | Refills: 0 | DISCHARGE

## 2021-10-21 RX ORDER — ACETAMINOPHEN 500 MG
3 TABLET ORAL
Qty: 0 | Refills: 0 | DISCHARGE
Start: 2021-10-21

## 2021-10-21 RX ADMIN — Medication 600 MILLIGRAM(S): at 09:06

## 2021-10-21 RX ADMIN — Medication 975 MILLIGRAM(S): at 06:00

## 2021-10-21 RX ADMIN — Medication 75 MICROGRAM(S): at 05:45

## 2021-10-21 RX ADMIN — Medication 600 MILLIGRAM(S): at 09:30

## 2021-10-21 RX ADMIN — Medication 975 MILLIGRAM(S): at 05:30

## 2021-10-21 RX ADMIN — Medication 975 MILLIGRAM(S): at 00:00

## 2021-10-21 RX ADMIN — Medication 975 MILLIGRAM(S): at 11:46

## 2021-10-21 NOTE — DISCHARGE NOTE OB - CARE PROVIDER_API CALL
Demetra Good  OBSTETRICS AND GYNECOLOGY  [address], [address]  [place] [address]  Phone: [phone]  Fax: [phone]  Follow Up Time:

## 2021-10-21 NOTE — PROGRESS NOTE ADULT - SUBJECTIVE AND OBJECTIVE BOX
OB Progress Note:  PPD#1    S: 20yo  PPD#1 s/p . Patient feels well. Pain is well controlled. She is tolerating a regular diet and passing flatus. She is voiding spontaneously, and ambulating without difficulty. Denies CP/SOB. Denies lightheadedness/dizziness. Denies N/V.    O:  Vitals:  Vital Signs Last 24 Hrs  T(C): 36.4 (20 Oct 2021 05:35), Max: 36.9 (19 Oct 2021 18:30)  T(F): 97.5 (20 Oct 2021 05:35), Max: 98.4 (19 Oct 2021 18:30)  HR: 73 (20 Oct 2021 05:35) (68 - 110)  BP: 99/69 (20 Oct 2021 05:35) (93/57 - 138/81)  BP(mean): --  RR: 18 (20 Oct 2021 05:35) (16 - 18)  SpO2: 98% (20 Oct 2021 05:35) (92% - 100%)    MEDICATIONS  (STANDING):  acetaminophen   Tablet .. 975 milliGRAM(s) Oral <User Schedule>  diphtheria/tetanus/pertussis (acellular) Vaccine (ADAcel) 0.5 milliLiter(s) IntraMuscular once  ibuprofen  Tablet. 600 milliGRAM(s) Oral every 6 hours  levothyroxine 75 MICROGram(s) Oral daily  oxytocin Infusion 333.333 milliUNIT(s)/Min (1000 mL/Hr) IV Continuous <Continuous>  prenatal multivitamin 1 Tablet(s) Oral daily  sodium chloride 0.9% lock flush 3 milliLiter(s) IV Push every 8 hours      Labs:  Blood type: A Positive  Rubella IgG: RPR: Negative                          11.2<L>   14.82<H> >-----------< 211    ( 10-19 @ 05:37 )             34.6        Physical Exam:  General: NAD  Abdomen: soft, non-tender, non-distended, fundus firm and below the umbillicus   Vaginal: Lochia wnl  Extremities: No erythema/edema. No calf tenderness     
Postpartum Note- PPD#2      S:Patient w/o complaints, pain is controlled.    Pt is OOB, tolerating PO, voiding. Vaginal bleeding mild to moderate.       O:  Vital Signs Last 24 Hrs  T(C): 36.3 (21 Oct 2021 05:58), Max: 36.8 (20 Oct 2021 17:20)  T(F): 97.3 (21 Oct 2021 05:58), Max: 98.3 (20 Oct 2021 17:20)  HR: 69 (21 Oct 2021 05:58) (68 - 81)  BP: 119/81 (21 Oct 2021 05:58) (92/62 - 120/74)  BP(mean): --  RR: 18 (21 Oct 2021 05:58) (18 - 18)  SpO2: 97% (21 Oct 2021 05:58) (96% - 99%)         LABS:

## 2021-10-21 NOTE — DISCHARGE NOTE OB - CARE PLAN
1 Principal Discharge DX:	 (normal spontaneous vaginal delivery)  Assessment and plan of treatment:	return to baseline health, regular diet, pain control, follow up with Dr Good

## 2021-10-21 NOTE — DISCHARGE NOTE OB - PATIENT PORTAL LINK FT
You can access the FollowMyHealth Patient Portal offered by [facility] by registering at the following website: [email or web address]. By joining [facility]’s FollowMyHealth portal, you will also be able to view your health information using other applications (apps) compatible with our system.

## 2021-10-21 NOTE — PROGRESS NOTE ADULT - ASSESSMENT
Per Dr. Good-  Abdomen: Soft, nontender, non-distended, fundus firm.  Vaginal: Lochia WNL.   Ext: Neg edema, Neg calf tenderness
A/P: 22yo PPD#1 s/p .  Patient is stable and doing well post-partum.   - Pain well controlled, continue current pain regimen  - Increase ambulation, SCDs when not ambulating  - Continue regular diet    Arlette Jorgensen MD  PGY-1

## 2021-11-17 NOTE — REASON FOR VISIT
After Visit Summary Template Not Found    This Print Group is only intended to be used in the After Visit Summary and can only be used in a report that uses a released After Visit Summary Template.                      MRN:[id number]                   After Visit Summary   11/17/2021    Rogelio Trejo   MRN: [id number]           Visit Information        Department      11/17/2021  8:41 AM [facility] Interventional Radiology          Review of your medicines      UNREVIEWED medicines. Ask your doctor about these medicines       Dose / Directions   apixaban ANTICOAGULANT 5 MG tablet  Commonly known as: ELIQUIS      Dose: 5 mg  Take 5 mg by mouth 2 times daily  Refills: 0     atorvastatin 20 MG tablet  Commonly known as: LIPITOR      Dose: 20 mg  Take 20 mg by mouth daily  Refills: 0     gabapentin 300 MG capsule  Commonly known as: NEURONTIN      Dose: 300 mg  Take 300 mg by mouth 3 times daily  Refills: 0     hydrochlorothiazide 12.5 MG tablet  Commonly known as: HYDRODIURIL      Dose: 12.5 mg  Take 12.5 mg by mouth daily  Refills: 0              Protect others around you: Learn how to safely use, store and throw away your medicines at www.disposemymeds.org.       Follow-ups after your visit       Care Instructions       Further instructions from your care team         Percutaneous Nephrostomy  Percutaneous nephrostomy is a procedure where a small tube (catheter) is put through your skin into your kidney to drain your urine. This procedure is done by a specially trained doctor called an interventional radiologist.   Why percutaneous nephrostomy is done  Percutaneous nephrostomy may be needed when a kidney or a tube (ureter) leading from a kidney to your bladder gets blocked. This can happen because of kidney stones, tumors, or another cause. The blockage can cause a backup of urine in the kidney. This procedure is done to stop pain, infection, and kidney damage.     Risks of percutaneous  nephrostomy  All procedures have some risks. Possible risks of this procedure include:    Bleeding of your kidney    Blockage of the catheter    Blood infection (sepsis)    Kidney infection    Problems because of the X-ray dye (contrast medium). These include allergic reaction or kidney damage.    Skin infection around the catheter site    The catheter may need to be replaced if it is used for a long time. The same procedure is used.    Urine leak    X-ray radiation exposure, which is considered to be low level and safe   Getting ready for your procedure  Tell your healthcare provider if you:    Are allergic to X-ray dye or other medicines    Are breastfeeding    Are pregnant or think you may be pregnant  Tell your healthcare provider about any recent illnesses, all medical conditions, and all medicines you take. You may need to stop taking some or all of them before your procedure. This includes:     All prescription medicines    Any street drugs you may use    Herbs, vitamins, and other supplements     Over-the-counter medicines that don t need a prescription, including aspirin and ibuprofen   Also be sure to:    Follow any directions you re given for not eating or drinking before your procedure.    Follow any other instructions from your healthcare provider.    Plan to have a relative or friend drive you home after your procedure. You can t drive yourself.  During your procedure    You will change into a hospital gown.    An IV line is put into your hand or arm to give you fluids and medicines. You will then lie on your stomach on an X-ray table. You may be given medicine to help you relax and make you feel sleepy.    The skin on your lower back is numbed with an injection of local anesthesia.    The radiologist will use CT scan, ultrasound, or fluoroscopy, images as a guide. He or she will insert a needle through your lower back into your kidney. X-ray dye may be injected through this needle into your kidney.  This fluid makes your kidney easier to see on X-ray images. The X-ray images can show exactly where your kidney or ureter is blocked.    The needle is then replaced with a thin tube called a drainage catheter. The catheter is attached to a drainage bag. This bag collects the urine that drains from your kidney. The catheter may be stitched (sutured) or taped to your skin. This helps keep it in place and stop it from moving.    The entire procedure takes about 1 to 2 hours. You will remain in the recovery room until you are completely awake and ready to go home.    After your procedure  The catheter will stay in place until the problem that caused the urine buildup is treated. This may be for as little as a day or as long as a few weeks or months. The bag is secured to your leg so you can walk around. During the time the catheter is in place you should:     Keep the skin around the catheter clean and dry.    Be careful not to move or knock the catheter out of place. Make sure that the drainage bag is secured firmly to your leg.    Empty the drainage bag often. This keeps the weight of the bag from pulling on the catheter.    Your healthcare provider will give you detailed instructions on how to care for your catheter and drainage bag.  When to call your healthcare provider  Call your healthcare provider if your urine becomes cloudy or smells bad, or if you develop fever or chills. Follow all instructions given to you by your healthcare provider.   [place] last reviewed this educational content on 8/1/2020 2000-2021 [employer]. All rights reserved. This information is not intended as a substitute for professional medical care. Always follow your healthcare professional's instructions.          Additional Information About Your Visit       MyChart Information    [email or web address] lets you send messages to your doctor, view your test results, renew your prescriptions, schedule appointments and more. To sign up,  "go to [email or web address] . Click on \"Log in\" on the left side of the screen, which will take you to the Welcome page. Then click on \"Sign up Now\" on the right side of the page.     You will be asked to enter the access code listed below, as well as some personal information. Please follow the directions to create your username and password.     Your access code is: [email or web address]  Expires: 2022 11:15 AM     Your access code will  in 60 days. If you need help or a new code, please call your   [facility] clinic or [phone].       Care EveryWhere ID    This is your Care EveryWhere ID. This could be used by other organizations to access your [facility] medical records  [id number]       Your Vitals Were  Most recent update: 2021  9:22 AM    Blood Pressure   127/95 (BP Location: Right arm)    Pulse   91    Temperature   97.2  F (36.2  C) (Temporal)    Respirations   20    Pulse Oximetry   90%          Primary Care Provider Office Phone # Fax #    Nicholas Price [phone]      Equal Access to Services    ALEJANDRINA JOHNSON AH: Hadii beth maldonado Soedenilson, waaxda luqadaha, qaybta kaalmada adesilasyada, alex saab. So [facility] [phone].    ATENCIÓN: Si habla español, tiene a manning disposición servicios gratuitos de asistencia lingüística. Llame al [phone].    We comply with applicable federal and state civil rights laws, including the [place] Human Rights Act. We do not discriminate on the basis of race, color, creed, Faith, national origin, marital status, age, disability, sex, sexual orientation, or gender identity.    If you would like an itemization of your charges they will now be available in [facility] 30 days after discharge. To access the itemized statements in [facility] go to billing/billing summary. From there select view account. There will be multiple tabs showing an overview of your account, detail, payments, and communications. " From the communications tab you can see your monthly statements, your itemized statements, and any billing letters generated for your account. If you do not have a [facility] account and need help getting access please contact [employer] support at [phone].  If you would prefer to have your itemized statements mailed please contact our automated itemized bill request line at [phone] option  2.       Thank you!    Thank you for choosing [facility] for your care. Our goal is always to provide you with excellent care. Hearing back from our patients is one way we can continue to improve our services. Please take a few minutes to complete the written survey that you may receive in the mail after you visit. If you would like to speak to someone directly about your visit please contact Patient Relations at [phone]. Thank you!              Medication List      ASK your doctor about these medications          Morning Afternoon Evening Bedtime As Needed    apixaban ANTICOAGULANT 5 MG tablet  Also known as: ELIQUIS  INSTRUCTIONS: Take 5 mg by mouth 2 times daily                     atorvastatin 20 MG tablet  Also known as: LIPITOR  INSTRUCTIONS: Take 20 mg by mouth daily                     gabapentin 300 MG capsule  Also known as: NEURONTIN  INSTRUCTIONS: Take 300 mg by mouth 3 times daily                     hydrochlorothiazide 12.5 MG tablet  Also known as: HYDRODIURIL  INSTRUCTIONS: Take 12.5 mg by mouth daily                        [FreeTextEntry1] : 20-year-old woman here for followup evaluation.H/P Syrinx\par She reports that she is pregnant and comes with questions regarding her [facility] plan of care.\par She ambulates independently.

## 2022-05-12 ENCOUNTER — RESULT REVIEW (OUTPATIENT)
Age: 22
End: 2022-05-12

## 2023-07-11 ENCOUNTER — APPOINTMENT (OUTPATIENT)
Dept: COLORECTAL SURGERY | Facility: CLINIC | Age: 23
End: 2023-07-11
Payer: COMMERCIAL

## 2023-07-11 VITALS
BODY MASS INDEX: 20.77 KG/M2 | OXYGEN SATURATION: 96 % | RESPIRATION RATE: 15 BRPM | SYSTOLIC BLOOD PRESSURE: 103 MMHG | TEMPERATURE: 98.4 F | WEIGHT: 110 LBS | DIASTOLIC BLOOD PRESSURE: 71 MMHG | HEIGHT: 61 IN | HEART RATE: 71 BPM

## 2023-07-11 DIAGNOSIS — K64.5 PERIANAL VENOUS THROMBOSIS: ICD-10-CM

## 2023-07-11 DIAGNOSIS — Z78.9 OTHER SPECIFIED HEALTH STATUS: ICD-10-CM

## 2023-07-11 PROCEDURE — 99203 OFFICE O/P NEW LOW 30 MIN: CPT

## 2023-07-11 RX ORDER — ERGOCALCIFEROL (VITAMIN D2) 10 MCG
27-0.8 TABLET ORAL DAILY
Qty: 90 | Refills: 3 | Status: DISCONTINUED | COMMUNITY
Start: 2021-09-13 | End: 2023-07-11

## 2023-07-11 RX ORDER — LEVOTHYROXINE SODIUM 0.15 MG/1
150 TABLET ORAL
Refills: 0 | Status: ACTIVE | COMMUNITY

## 2023-07-11 RX ORDER — GLUC/MSM/COLGN2/HYAL/ANTIARTH3 375-375-20
240 (27 FE) TABLET ORAL DAILY
Refills: 0 | Status: DISCONTINUED | COMMUNITY
Start: 2021-09-13 | End: 2023-07-11

## 2023-07-11 NOTE — CONSULT LETTER
[Dear  ___] : Dear  [unfilled], [Please see my note below.] : Please see my note below. [Consult Closing:] : Thank you very much for allowing me to participate in the care of this patient.  If you have any questions, please do not hesitate to contact me. [Sincerely,] : Sincerely, [FreeTextEntry2] : Demetra Good [FreeTextEntry3] : Speedy Goddard MD FACS\par Chief Colon and Rectal Surgery\par [facility]

## 2023-07-11 NOTE — PHYSICAL EXAM
[Normal Breath Sounds] : Normal breath sounds [Normal Heart Sounds] : normal heart sounds [Normal Rate and Rhythm] : normal rate and rhythm [No Rash or Lesion] : No rash or lesion [Alert] : alert [Oriented to Person] : oriented to person [Oriented to Place] : oriented to place [Oriented to Time] : oriented to time [Calm] : calm [de-identified] : soft, NT/ND, +BS [de-identified] : well nourished female [de-identified] : NC/AT [de-identified] : LARISSA/+ROM [de-identified] : intact

## 2023-07-11 NOTE — HISTORY OF PRESENT ILLNESS
[FreeTextEntry1] : 22 year old female presents with thrombosed hemorrhoid. She is experiencing rectal pain and swelling since last Tuesday. She also complains of fatigue, decreased appetite and bloating. She went to see a GI yesterday for these issues but offered no intervention. Currently, patient states that her pain is improved compared to yesterday. Patient denies any recent changes in her bowel habits. She reports daily BM. \par \par Patient denies family history of colon/rectal cancer, IBD. No history of colonoscopy.

## 2023-07-11 NOTE — ASSESSMENT
[FreeTextEntry1] : Thrombosed external hemorrhoid\par -I had a long discussion with the patient and her  regarding the natural history of thrombosed external hemorrhoid disease and treatment options going forward. We discussed observation versus formal enucleation. Patient reports today being the first day of improve symptoms, however, acute tenderness is noted on physical exam. After discussing risks and benefits of manipulation versus observation, the patient elected to attempt conservative management\par -She will initiate fiber supplementation daily with Metamucil\par -Sitz bath as needed\par -Hydrocortisone cream as needed my-if persistent or worsening pain, she will followup in the office for enucleation\par -All questions were answered

## 2023-09-19 ENCOUNTER — APPOINTMENT (OUTPATIENT)
Dept: CARDIOLOGY | Facility: CLINIC | Age: 23
End: 2023-09-19
Payer: COMMERCIAL

## 2023-09-19 ENCOUNTER — LABORATORY RESULT (OUTPATIENT)
Age: 23
End: 2023-09-19

## 2023-09-19 VITALS
BODY MASS INDEX: 20.58 KG/M2 | HEART RATE: 78 BPM | TEMPERATURE: 98.7 F | WEIGHT: 109 LBS | DIASTOLIC BLOOD PRESSURE: 72 MMHG | HEIGHT: 61 IN | SYSTOLIC BLOOD PRESSURE: 110 MMHG | RESPIRATION RATE: 16 BRPM | OXYGEN SATURATION: 98 %

## 2023-09-19 DIAGNOSIS — M79.89 OTHER SPECIFIED SOFT TISSUE DISORDERS: ICD-10-CM

## 2023-09-19 DIAGNOSIS — Z13.228 ENCOUNTER FOR SCREENING FOR OTHER METABOLIC DISORDERS: ICD-10-CM

## 2023-09-19 PROCEDURE — 99214 OFFICE O/P EST MOD 30 MIN: CPT | Mod: 25

## 2023-09-19 PROCEDURE — 93306 TTE W/DOPPLER COMPLETE: CPT

## 2023-09-19 PROCEDURE — 93970 EXTREMITY STUDY: CPT

## 2023-09-19 PROCEDURE — 93000 ELECTROCARDIOGRAM COMPLETE: CPT | Mod: 59

## 2023-09-19 RX ORDER — DROSPIRENONE AND ETHINYL ESTRADIOL TABLETS 0.02-3(28)
3-0.02 KIT ORAL
Refills: 0 | Status: ACTIVE | COMMUNITY
Start: 2023-09-19

## 2023-09-22 DIAGNOSIS — R82.90 UNSPECIFIED ABNORMAL FINDINGS IN URINE: ICD-10-CM

## 2023-09-22 RX ORDER — CEFDINIR 300 MG/1
300 CAPSULE ORAL TWICE DAILY
Qty: 14 | Refills: 0 | Status: ACTIVE | COMMUNITY
Start: 2023-09-22 | End: 1900-01-01

## 2023-09-27 ENCOUNTER — APPOINTMENT (OUTPATIENT)
Dept: INTERNAL MEDICINE | Facility: CLINIC | Age: 23
End: 2023-09-27
Payer: COMMERCIAL

## 2023-09-27 VITALS
DIASTOLIC BLOOD PRESSURE: 70 MMHG | OXYGEN SATURATION: 98 % | SYSTOLIC BLOOD PRESSURE: 98 MMHG | HEART RATE: 86 BPM | HEIGHT: 61 IN | WEIGHT: 109 LBS | BODY MASS INDEX: 20.58 KG/M2

## 2023-09-27 VITALS — SYSTOLIC BLOOD PRESSURE: 98 MMHG | DIASTOLIC BLOOD PRESSURE: 70 MMHG

## 2023-09-27 VITALS — SYSTOLIC BLOOD PRESSURE: 104 MMHG | DIASTOLIC BLOOD PRESSURE: 78 MMHG

## 2023-09-27 DIAGNOSIS — R58 HEMORRHAGE, NOT ELSEWHERE CLASSIFIED: ICD-10-CM

## 2023-09-27 DIAGNOSIS — R53.83 OTHER FATIGUE: ICD-10-CM

## 2023-09-27 DIAGNOSIS — N39.0 URINARY TRACT INFECTION, SITE NOT SPECIFIED: ICD-10-CM

## 2023-09-27 DIAGNOSIS — E03.9 HYPOTHYROIDISM, UNSPECIFIED: ICD-10-CM

## 2023-09-27 DIAGNOSIS — R42 DIZZINESS AND GIDDINESS: ICD-10-CM

## 2023-09-27 DIAGNOSIS — R79.89 OTHER SPECIFIED ABNORMAL FINDINGS OF BLOOD CHEMISTRY: ICD-10-CM

## 2023-09-27 PROCEDURE — 99214 OFFICE O/P EST MOD 30 MIN: CPT

## 2023-09-28 LAB
ALBUMIN SERPL ELPH-MCNC: 4.9 G/DL
ALP BLD-CCNC: 37 U/L
ALT SERPL-CCNC: 15 U/L
ANION GAP SERPL CALC-SCNC: 15 MMOL/L
AST SERPL-CCNC: 17 U/L
BASOPHILS # BLD AUTO: 0.05 K/UL
BASOPHILS NFR BLD AUTO: 0.4 %
BILIRUB SERPL-MCNC: 0.3 MG/DL
BUN SERPL-MCNC: 11 MG/DL
C TRACH RRNA SPEC QL NAA+PROBE: NOT DETECTED
CALCIUM SERPL-MCNC: 9.8 MG/DL
CHLORIDE SERPL-SCNC: 102 MMOL/L
CO2 SERPL-SCNC: 23 MMOL/L
CREAT SERPL-MCNC: 0.8 MG/DL
EGFR: 106 ML/MIN/1.73M2
EOSINOPHIL # BLD AUTO: 0.13 K/UL
EOSINOPHIL NFR BLD AUTO: 1.1 %
FERRITIN SERPL-MCNC: 430 NG/ML
GLUCOSE SERPL-MCNC: 80 MG/DL
HCG SERPL-MCNC: <1 MIU/ML
HCT VFR BLD CALC: 38.3 %
HGB BLD-MCNC: 12.5 G/DL
IMM GRANULOCYTES NFR BLD AUTO: 0.3 %
LYMPHOCYTES # BLD AUTO: 1.96 K/UL
LYMPHOCYTES NFR BLD AUTO: 16.2 %
MAN DIFF?: NORMAL
MCHC RBC-ENTMCNC: 28 PG
MCHC RBC-ENTMCNC: 32.6 GM/DL
MCV RBC AUTO: 85.7 FL
MONOCYTES # BLD AUTO: 0.48 K/UL
MONOCYTES NFR BLD AUTO: 4 %
N GONORRHOEA RRNA SPEC QL NAA+PROBE: NOT DETECTED
NEUTROPHILS # BLD AUTO: 9.47 K/UL
NEUTROPHILS NFR BLD AUTO: 78 %
PLATELET # BLD AUTO: 274 K/UL
POTASSIUM SERPL-SCNC: 3.9 MMOL/L
PROT SERPL-MCNC: 7.5 G/DL
RBC # BLD: 4.47 M/UL
RBC # FLD: 13.6 %
SODIUM SERPL-SCNC: 140 MMOL/L
SOURCE AMPLIFICATION: NORMAL
WBC # FLD AUTO: 12.13 K/UL

## 2023-10-01 ENCOUNTER — NON-APPOINTMENT (OUTPATIENT)
Age: 23
End: 2023-10-01

## 2023-10-02 LAB — TM INTERPRETATION: NORMAL

## 2023-10-08 PROCEDURE — 93241 XTRNL ECG REC>48HR<7D: CPT

## 2023-10-10 ENCOUNTER — APPOINTMENT (OUTPATIENT)
Dept: CARDIOLOGY | Facility: CLINIC | Age: 23
End: 2023-10-10

## 2023-10-11 ENCOUNTER — NON-APPOINTMENT (OUTPATIENT)
Age: 23
End: 2023-10-11

## 2023-10-11 ENCOUNTER — APPOINTMENT (OUTPATIENT)
Dept: PULMONOLOGY | Facility: CLINIC | Age: 23
End: 2023-10-11
Payer: COMMERCIAL

## 2023-10-11 ENCOUNTER — APPOINTMENT (OUTPATIENT)
Dept: INTERNAL MEDICINE | Facility: CLINIC | Age: 23
End: 2023-10-11
Payer: COMMERCIAL

## 2023-10-11 ENCOUNTER — APPOINTMENT (OUTPATIENT)
Dept: INTERNAL MEDICINE | Facility: CLINIC | Age: 23
End: 2023-10-11

## 2023-10-11 VITALS — HEART RATE: 101 BPM | OXYGEN SATURATION: 96 %

## 2023-10-11 VITALS
HEIGHT: 61 IN | TEMPERATURE: 98.9 F | BODY MASS INDEX: 20.58 KG/M2 | OXYGEN SATURATION: 96 % | SYSTOLIC BLOOD PRESSURE: 110 MMHG | HEART RATE: 111 BPM | WEIGHT: 109 LBS | DIASTOLIC BLOOD PRESSURE: 70 MMHG

## 2023-10-11 VITALS — TEMPERATURE: 98.4 F

## 2023-10-11 DIAGNOSIS — R05.9 COUGH, UNSPECIFIED: ICD-10-CM

## 2023-10-11 DIAGNOSIS — R07.89 OTHER CHEST PAIN: ICD-10-CM

## 2023-10-11 DIAGNOSIS — R68.83 CHILLS (WITHOUT FEVER): ICD-10-CM

## 2023-10-11 PROCEDURE — 93000 ELECTROCARDIOGRAM COMPLETE: CPT

## 2023-10-11 PROCEDURE — 71046 X-RAY EXAM CHEST 2 VIEWS: CPT

## 2023-10-11 PROCEDURE — 99214 OFFICE O/P EST MOD 30 MIN: CPT | Mod: 25

## 2023-10-11 RX ORDER — BENZONATATE 100 MG/1
100 CAPSULE ORAL 3 TIMES DAILY
Qty: 30 | Refills: 0 | Status: ACTIVE | COMMUNITY
Start: 2023-10-11 | End: 1900-01-01

## 2023-10-12 ENCOUNTER — TRANSCRIPTION ENCOUNTER (OUTPATIENT)
Age: 23
End: 2023-10-12

## 2023-10-12 ENCOUNTER — RESULT CHARGE (OUTPATIENT)
Age: 23
End: 2023-10-12

## 2023-10-12 LAB
ALBUMIN SERPL ELPH-MCNC: 4.7 G/DL
ALP BLD-CCNC: 61 U/L
ALT SERPL-CCNC: 13 U/L
ANION GAP SERPL CALC-SCNC: 20 MMOL/L
AST SERPL-CCNC: 23 U/L
BASOPHILS # BLD AUTO: 0.05 K/UL
BASOPHILS NFR BLD AUTO: 0.4 %
BILIRUB SERPL-MCNC: 0.2 MG/DL
BUN SERPL-MCNC: 9 MG/DL
CALCIUM SERPL-MCNC: 9.9 MG/DL
CHLORIDE SERPL-SCNC: 102 MMOL/L
CO2 SERPL-SCNC: 18 MMOL/L
CREAT SERPL-MCNC: 0.61 MG/DL
DEPRECATED D DIMER PPP IA-ACNC: <150 NG/ML DDU
EGFR: 128 ML/MIN/1.73M2
EOSINOPHIL # BLD AUTO: 0.16 K/UL
EOSINOPHIL NFR BLD AUTO: 1.4 %
FERRITIN SERPL-MCNC: 430 NG/ML
FOLATE SERPL-MCNC: 9.9 NG/ML
GLUCOSE SERPL-MCNC: 60 MG/DL
HCG SERPL-MCNC: <1 MIU/ML
HCG UR QL: NEGATIVE
HCT VFR BLD CALC: 39.9 %
HGB BLD-MCNC: 12.8 G/DL
IMM GRANULOCYTES NFR BLD AUTO: 0.3 %
IRON SATN MFR SERPL: 6 %
IRON SERPL-MCNC: 19 UG/DL
LYMPHOCYTES # BLD AUTO: 2.54 K/UL
LYMPHOCYTES NFR BLD AUTO: 21.7 %
MAN DIFF?: NORMAL
MCHC RBC-ENTMCNC: 27.8 PG
MCHC RBC-ENTMCNC: 32.1 GM/DL
MCV RBC AUTO: 86.6 FL
MONOCYTES # BLD AUTO: 0.68 K/UL
MONOCYTES NFR BLD AUTO: 5.8 %
NEUTROPHILS # BLD AUTO: 8.22 K/UL
NEUTROPHILS NFR BLD AUTO: 70.4 %
PLATELET # BLD AUTO: 324 K/UL
POTASSIUM SERPL-SCNC: 4.1 MMOL/L
PROCALCITONIN SERPL-MCNC: 0.05 NG/ML
PROT SERPL-MCNC: 7.8 G/DL
RAPID RVP RESULT: DETECTED
RBC # BLD: 4.61 M/UL
RBC # FLD: 13.5 %
RV+EV RNA SPEC QL NAA+PROBE: DETECTED
SARS-COV-2 RNA PNL RESP NAA+PROBE: NOT DETECTED
SODIUM SERPL-SCNC: 140 MMOL/L
TIBC SERPL-MCNC: 322 UG/DL
UIBC SERPL-MCNC: 303 UG/DL
VIT B12 SERPL-MCNC: 642 PG/ML
WBC # FLD AUTO: 11.68 K/UL

## 2023-10-15 PROBLEM — R68.83 CHILLS: Status: ACTIVE | Noted: 2023-10-11

## 2023-10-15 PROBLEM — R05.9 COUGH: Status: ACTIVE | Noted: 2023-10-11

## 2023-10-15 PROBLEM — R07.89 CHEST TIGHTNESS OR PRESSURE: Status: ACTIVE | Noted: 2021-09-13

## 2023-10-16 LAB
APPEARANCE: CLEAR
BACTERIA: NEGATIVE /HPF
BASOPHILS # BLD AUTO: 0.03 K/UL
BASOPHILS NFR BLD AUTO: 0.3 %
BILIRUBIN URINE: NEGATIVE
BLOOD URINE: NEGATIVE
CAST: 0 /LPF
COLOR: YELLOW
EOSINOPHIL # BLD AUTO: 0.19 K/UL
EOSINOPHIL NFR BLD AUTO: 1.9 %
EPITHELIAL CELLS: 3 /HPF
GLUCOSE QUALITATIVE U: NEGATIVE MG/DL
HCT VFR BLD CALC: 36.4 %
HGB BLD-MCNC: 11.9 G/DL
IMM GRANULOCYTES NFR BLD AUTO: 0.4 %
KETONES URINE: NEGATIVE MG/DL
LEUKOCYTE ESTERASE URINE: NEGATIVE
LYMPHOCYTES # BLD AUTO: 1.88 K/UL
LYMPHOCYTES NFR BLD AUTO: 18.6 %
MAN DIFF?: NORMAL
MCHC RBC-ENTMCNC: 28 PG
MCHC RBC-ENTMCNC: 32.7 GM/DL
MCV RBC AUTO: 85.6 FL
MICROSCOPIC-UA: NORMAL
MONOCYTES # BLD AUTO: 0.53 K/UL
MONOCYTES NFR BLD AUTO: 5.2 %
NEUTROPHILS # BLD AUTO: 7.43 K/UL
NEUTROPHILS NFR BLD AUTO: 73.6 %
NITRITE URINE: NEGATIVE
PH URINE: 6.5
PLATELET # BLD AUTO: 336 K/UL
PROTEIN URINE: NEGATIVE MG/DL
RBC # BLD: 4.25 M/UL
RBC # FLD: 13.2 %
RED BLOOD CELLS URINE: 0 /HPF
SPECIFIC GRAVITY URINE: 1.02
UROBILINOGEN URINE: 1 MG/DL
WBC # FLD AUTO: 10.1 K/UL
WHITE BLOOD CELLS URINE: 0 /HPF

## 2023-10-17 ENCOUNTER — APPOINTMENT (OUTPATIENT)
Dept: GASTROENTEROLOGY | Facility: CLINIC | Age: 23
End: 2023-10-17
Payer: COMMERCIAL

## 2023-10-17 ENCOUNTER — APPOINTMENT (OUTPATIENT)
Dept: INTERNAL MEDICINE | Facility: CLINIC | Age: 23
End: 2023-10-17

## 2023-10-17 ENCOUNTER — NON-APPOINTMENT (OUTPATIENT)
Age: 23
End: 2023-10-17

## 2023-10-17 VITALS
HEIGHT: 61 IN | HEART RATE: 80 BPM | WEIGHT: 106 LBS | SYSTOLIC BLOOD PRESSURE: 100 MMHG | DIASTOLIC BLOOD PRESSURE: 60 MMHG | OXYGEN SATURATION: 99 % | BODY MASS INDEX: 20.01 KG/M2

## 2023-10-17 DIAGNOSIS — K62.89 OTHER SPECIFIED DISEASES OF ANUS AND RECTUM: ICD-10-CM

## 2023-10-17 DIAGNOSIS — R11.0 NAUSEA: ICD-10-CM

## 2023-10-17 DIAGNOSIS — M54.50 LOW BACK PAIN, UNSPECIFIED: ICD-10-CM

## 2023-10-17 DIAGNOSIS — G89.29 LOW BACK PAIN, UNSPECIFIED: ICD-10-CM

## 2023-10-17 LAB — BACTERIA UR CULT: NORMAL

## 2023-10-17 PROCEDURE — 99203 OFFICE O/P NEW LOW 30 MIN: CPT | Mod: 25

## 2023-10-17 PROCEDURE — 46600 DIAGNOSTIC ANOSCOPY SPX: CPT

## 2023-10-17 RX ORDER — HYDROCORTISONE ACETATE 30 MG/1
30 SUPPOSITORY RECTAL
Qty: 14 | Refills: 2 | Status: ACTIVE | COMMUNITY
Start: 2023-10-17 | End: 1900-01-01

## 2023-10-19 ENCOUNTER — NON-APPOINTMENT (OUTPATIENT)
Age: 23
End: 2023-10-19

## 2023-10-20 ENCOUNTER — APPOINTMENT (OUTPATIENT)
Dept: INTERNAL MEDICINE | Facility: CLINIC | Age: 23
End: 2023-10-20
Payer: COMMERCIAL

## 2023-10-20 ENCOUNTER — LABORATORY RESULT (OUTPATIENT)
Age: 23
End: 2023-10-20

## 2023-10-20 VITALS
DIASTOLIC BLOOD PRESSURE: 70 MMHG | SYSTOLIC BLOOD PRESSURE: 110 MMHG | TEMPERATURE: 97.7 F | WEIGHT: 106 LBS | OXYGEN SATURATION: 98 % | HEIGHT: 61 IN | HEART RATE: 90 BPM | BODY MASS INDEX: 20.01 KG/M2

## 2023-10-20 DIAGNOSIS — R79.89 OTHER SPECIFIED ABNORMAL FINDINGS OF BLOOD CHEMISTRY: ICD-10-CM

## 2023-10-20 DIAGNOSIS — D72.829 ELEVATED WHITE BLOOD CELL COUNT, UNSPECIFIED: ICD-10-CM

## 2023-10-20 DIAGNOSIS — R10.2 PELVIC AND PERINEAL PAIN: ICD-10-CM

## 2023-10-20 DIAGNOSIS — E61.1 IRON DEFICIENCY: ICD-10-CM

## 2023-10-20 DIAGNOSIS — K64.8 OTHER HEMORRHOIDS: ICD-10-CM

## 2023-10-20 PROCEDURE — 99214 OFFICE O/P EST MOD 30 MIN: CPT

## 2023-10-21 PROBLEM — D72.829 LEUKOCYTOSIS: Status: ACTIVE | Noted: 2023-10-20

## 2023-10-21 PROBLEM — E61.1 LOW IRON: Status: ACTIVE | Noted: 2023-10-20

## 2023-10-21 PROBLEM — K64.8 HEMORRHOIDS, INTERNAL: Status: ACTIVE | Noted: 2023-10-17

## 2023-10-21 PROBLEM — R79.89 ELEVATED FERRITIN LEVEL: Status: ACTIVE | Noted: 2023-09-27

## 2023-10-21 PROBLEM — R10.2 PELVIC CRAMPING: Status: ACTIVE | Noted: 2023-10-20

## 2023-10-23 LAB
AMYLASE/CREAT SERPL: 50 U/L
ANA SER IF-ACNC: NEGATIVE
BASOPHILS # BLD AUTO: 0.03 K/UL
BASOPHILS NFR BLD AUTO: 0.5 %
CRP SERPL-MCNC: <3 MG/L
DSDNA AB SER-ACNC: <12 IU/ML
ENA SS-A AB SER IA-ACNC: <0.2 AL
ENA SS-B AB SER IA-ACNC: <0.2 AL
ENDOMYSIUM IGA SER QL: NEGATIVE
ENDOMYSIUM IGA TITR SER: NORMAL
EOSINOPHIL # BLD AUTO: 0.27 K/UL
EOSINOPHIL NFR BLD AUTO: 4.5 %
ERYTHROCYTE [SEDIMENTATION RATE] IN BLOOD BY WESTERGREN METHOD: 11 MM/HR
FERRITIN SERPL-MCNC: 410 NG/ML
GLIADIN IGA SER QL: <5 UNITS
GLIADIN IGG SER QL: <5 UNITS
GLIADIN PEPTIDE IGA SER-ACNC: NEGATIVE
GLIADIN PEPTIDE IGG SER-ACNC: NEGATIVE
HCT VFR BLD CALC: 38 %
HGB BLD-MCNC: 12.3 G/DL
IMM GRANULOCYTES NFR BLD AUTO: 0.2 %
LPL SERPL-CCNC: 17 U/L
LYMPHOCYTES # BLD AUTO: 2.08 K/UL
LYMPHOCYTES NFR BLD AUTO: 34.5 %
MAN DIFF?: NORMAL
MCHC RBC-ENTMCNC: 27.2 PG
MCHC RBC-ENTMCNC: 32.4 GM/DL
MCV RBC AUTO: 84.1 FL
MONOCYTES # BLD AUTO: 0.44 K/UL
MONOCYTES NFR BLD AUTO: 7.3 %
NEUTROPHILS # BLD AUTO: 3.2 K/UL
NEUTROPHILS NFR BLD AUTO: 53 %
PLATELET # BLD AUTO: 357 K/UL
RBC # BLD: 4.52 M/UL
RBC # FLD: 13.3 %
RHEUMATOID FACT SER QL: <10 IU/ML
TTG IGA SER IA-ACNC: <1.2 U/ML
TTG IGA SER-ACNC: NEGATIVE
TTG IGG SER IA-ACNC: 4 U/ML
TTG IGG SER IA-ACNC: NEGATIVE
WBC # FLD AUTO: 6.03 K/UL

## 2023-10-24 LAB
CCP AB SER IA-ACNC: <8 UNITS
RF+CCP IGG SER-IMP: NEGATIVE

## 2023-10-29 PROBLEM — N39.0 URINARY TRACT INFECTION WITHOUT HEMATURIA, SITE UNSPECIFIED: Status: ACTIVE | Noted: 2023-09-27

## 2023-10-29 PROBLEM — R42 DIZZINESS: Status: ACTIVE | Noted: 2023-09-19

## 2023-10-29 PROBLEM — R53.83 OTHER FATIGUE: Status: ACTIVE | Noted: 2023-09-19

## 2023-10-29 PROBLEM — R58 BLEEDING: Status: ACTIVE | Noted: 2023-09-27

## 2023-10-29 PROBLEM — E03.9 HYPOTHYROIDISM, UNSPECIFIED TYPE: Status: ACTIVE | Noted: 2023-09-19

## 2023-10-29 PROBLEM — R79.89 ELEVATED FERRITIN: Status: ACTIVE | Noted: 2023-09-27

## 2023-10-30 ENCOUNTER — NON-APPOINTMENT (OUTPATIENT)
Age: 23
End: 2023-10-30

## 2023-11-01 ENCOUNTER — APPOINTMENT (OUTPATIENT)
Dept: HEPATOLOGY | Facility: CLINIC | Age: 23
End: 2023-11-01
Payer: COMMERCIAL

## 2023-11-01 PROCEDURE — 76981 USE PARENCHYMA: CPT

## 2023-11-07 ENCOUNTER — NON-APPOINTMENT (OUTPATIENT)
Age: 23
End: 2023-11-07

## 2023-11-08 ENCOUNTER — TRANSCRIPTION ENCOUNTER (OUTPATIENT)
Age: 23
End: 2023-11-08

## 2023-11-16 ENCOUNTER — EMERGENCY (EMERGENCY)
Facility: HOSPITAL | Age: 23
LOS: 1 days | Discharge: HOME | End: 2023-11-16
Attending: STUDENT IN AN ORGANIZED HEALTH CARE EDUCATION/TRAINING PROGRAM
Payer: COMMERCIAL

## 2023-11-16 ENCOUNTER — APPOINTMENT (OUTPATIENT)
Dept: OBGYN | Facility: CLINIC | Age: 23
End: 2023-11-16
Payer: COMMERCIAL

## 2023-11-16 VITALS
OXYGEN SATURATION: 97 % | TEMPERATURE: 98 F | RESPIRATION RATE: 18 BRPM | HEIGHT: 61 IN | DIASTOLIC BLOOD PRESSURE: 87 MMHG | SYSTOLIC BLOOD PRESSURE: 121 MMHG | WEIGHT: 108.03 LBS | HEART RATE: 75 BPM

## 2023-11-16 PROCEDURE — 99395 PREV VISIT EST AGE 18-39: CPT

## 2023-11-16 PROCEDURE — 99284 EMERGENCY DEPT VISIT MOD MDM: CPT

## 2023-11-16 NOTE — ED ADULT TRIAGE NOTE - CHIEF COMPLAINT QUOTE
headache x 2-3 hours, took Advil an hour ago with no effect. headache x 2-3 hours, took Advil an hour ago with no effect; pmh syrnx headache x 2-3 hours, took Advil an hour ago with no effect; pmh syrinx  nx

## 2023-11-17 VITALS
TEMPERATURE: 98 F | RESPIRATION RATE: 18 BRPM | HEART RATE: 86 BPM | DIASTOLIC BLOOD PRESSURE: 70 MMHG | OXYGEN SATURATION: 97 % | SYSTOLIC BLOOD PRESSURE: 103 MMHG

## 2023-11-17 PROCEDURE — 99284 EMERGENCY DEPT VISIT MOD MDM: CPT | Mod: 25

## 2023-11-17 PROCEDURE — 70450 CT HEAD/BRAIN W/O DYE: CPT | Mod: 26,MA

## 2023-11-17 PROCEDURE — 70450 CT HEAD/BRAIN W/O DYE: CPT | Mod: MA

## 2023-11-17 RX ORDER — ACETAMINOPHEN 500 MG
975 TABLET ORAL ONCE
Refills: 0 | Status: COMPLETED | OUTPATIENT
Start: 2023-11-17 | End: 2023-11-17

## 2023-11-17 RX ORDER — ACETAMINOPHEN 500 MG
1000 TABLET ORAL ONCE
Refills: 0 | Status: DISCONTINUED | OUTPATIENT
Start: 2023-11-17 | End: 2023-11-17

## 2023-11-17 RX ORDER — METOCLOPRAMIDE HCL 10 MG
10 TABLET ORAL ONCE
Refills: 0 | Status: DISCONTINUED | OUTPATIENT
Start: 2023-11-17 | End: 2023-11-17

## 2023-11-17 RX ADMIN — Medication 975 MILLIGRAM(S): at 02:55

## 2023-11-17 NOTE — ED PROVIDER NOTE - CLINICAL SUMMARY MEDICAL DECISION MAKING FREE TEXT BOX
23 year old female with hx of syringomyelia presents with sudden onset severe headache x 6 hours, 10/10 in severity, now 5/10. Neuro exam benign, no deficits. Ambulatory without dizziness. Will obtain labs to evaluate for electrolyte abnormalities, anemia, hypoglycemia, as well as hcg, and CT head to r/o SAH. Tylenol for pain and reassess.

## 2023-11-17 NOTE — ED PROVIDER NOTE - NS ED ROS FT
CONSTITUTIONAL: No fevers, no chills, no lightheadedness, + dizziness  EYES: no visual changes, no eye pain  EARS: no ear drainage, no ear pain, no change in hearing  NOSE: + nasal congestion  MOUTH/THROAT: no sore throat  CV: No chest pain, no palpitations  RESP: No SOB, + cough  GI: No n/v/d, no abd pain  : no dysuria, no hematuria, no flank pain  MSK: no back pain, no extremity pain  SKIN: no rashes  NEURO: + headache, no focal weakness, no decreased sensation/parasthesias

## 2023-11-17 NOTE — ED PROVIDER NOTE - PROGRESS NOTE DETAILS
Catherine Suggs,  (PGY-1): Patient refusing IV line, will give PO Tylenol instead. Catherine Suggs DO (PGY-1): Spoke with Dr. Barahona who advises to keep patient in CDU for observation and neuro eval. Attending Dr. Mike: Extensive discussion with patient regarding this workup. She was offered both labs and CT angio which she declined. She was also offered admission as there are no CDU beds at this time and also declined. She has complete resolution of symptoms and did not have any neurologic deficits on initial or current exam. She wishes to be discharged home. Strict return precautions were discussed and she will be given a rapid referral to neurology.

## 2023-11-17 NOTE — ED ADULT NURSE NOTE - CINV DISCH TEACH PARTICIP
[facility]  GI/GI  HOSPITAL MEDICINE PROGRESS NOTE   Patient: Abhay Oconnor  Today's Date: 2023    YOB: 1927  Admission Date: 2023    MRN: [id number]  Inpatient LOS: 18    Attending: Oswald Atkinson MD  Hospital Day: Hospital Day: 19    Subjective   HISTORY AND SUBJECTIVE COMPLAINTS     Chief Complaint:   Shortness of breath    Interval History / Subjective:   Sitting up on the recliner.  Awake and alert.  Answering questions appropriately.    ROS:  Pertinent systems negative except as above.    Objective   PHYSICAL EXAMINATION     Vital 24 Hour Range Most Recent Value   Temperature Temp  Min: 97 °F (36.1 °C)  Max: 97.8 °F (36.6 °C) 97.3 °F (36.3 °C)   Pulse Pulse  Min: 85  Max: 95 95   Respiratory Resp  Min: 18  Max: 25 (!) 21   Blood Pressure BP  Min: 105/59  Max: 132/92 (!) 132/92   Pulse Oximetry SpO2  Min: 93 %  Max: 99 % 95 %   Arterial BP No data recorded     O2 O2 Flow Rate (L/min)  Av.4 L/min  Min: 5 L/min   Min taken time: 23 1119  Max: 8 L/min   Max taken time: 23 1042       Recorded Intake and Output:    Intake/Output Summary (Last 24 hours) at 2023 1210  Last data filed at 2023 1000  Gross per 24 hour   Intake 120 ml   Output 1100 ml   Net -980 ml      Recorded Last Stool Occurrence: 1 (23 0415)     Vital Most Recent Value First Value   Weight 74.4 kg (164 lb 0.4 oz) Weight: 70.8 kg (156 lb 1.4 oz)   Height 5' 7\" (170.2 cm) Height: 5' 9\" (175.3 cm)   BMI 25.69 N/A     General: Looks acutely ill mild respiratory distress   HEENT: NGT in place, dry mucous membranes  CV: regular rate and rhythm  Resp: noisy upper airway sounds, bibasilar crackles   Abd: soft, nontender and nondistended  Ext: +anasarca  Skin: +pallor  Neuro: no focal deficits noted and generalized weakness throughout    TEST RESULTS     Labs: The Laboratory values listed below have been reviewed and pertinent findings discussed in the Assessment and  Plan.    Laboratory values:   Recent Labs   Lab 02/23/23  0504 02/22/23  1000 02/21/23  0445   WBC 5.3 5.0 4.5   HGB 8.2* 8.3* 8.4*   HCT 27.6* 28.1* 28.4*   * 482* 484*       Recent Labs   Lab 02/23/23  0504 02/22/23  1000 02/21/23  0445   SODIUM 144 143 146*   POTASSIUM 4.0 3.6 4.0   CHLORIDE 109 107 106   CO2 36* 35* 36*   CALCIUM 8.6 8.7 8.3*   GLUCOSE 93 109* 123*   BUN 48* 45* 38*   CREATININE 0.62* 0.64* 0.68   MG 2.7* 3.1* 2.5*        No results found  Recent Labs   Lab 02/18/23  0710   NTPROB 2,708*         Radiology: Imaging studies have been reviewed and pertinent findings discussed in the Assessment and Plan.  No results found for any visits on 02/05/23 (from the past 48 hour(s)).     ANCILLARY ORDERS     Diet:  As Directed; Bananatrol Plus/banana Flakes; 3; Give 1 Packet Up To Tid Per Feeding Tube Tube Feeding Nutrition Supplement - Send With Tube Feeding  Npo Diet Without Exceptions  Telemetry: On  Consults:    IP CONSULT TO NEUROLOGY  IP CONSULT TO ORTHO  IP CONSULT TO PULMONOLOGY  IP CONSULT TO CARDIOLOGY  Therapy Orders:   PT and OT Orders Placed this Encounter   Procedures   • Occupational Therapy   • Physical Therapy       ADVANCED DIRECTIVES     Code Status: Selective Treatment/DNR             ASSESSMENT AND PLAN       #Acute hypoxic respiratory failure  # Aspiration pneumonia  # Fluid overload   -multifactorial from aspiration pneumonitis, atelectasis, volume overload, inability to clear secretions   -status post therapeutic bronchoscopy 2/19.  Repeat Therapeutic bronchoscopy today.   -TBT, VEST therapy, NAC nebs   -hold off antibiotics, low threshold to start if febrile or leukocytosis   -diuretics per Cardiology   -s/p antibiotic course   -Echo showed EF 47%   -currently on 10 L aerosolized oxygen    #Tonic clonic breakthrough seizure    -vEEG discontinued   -continue keppra, lacosamide   -seizure precautions    #Post ictal confusion on underlying dementia   -continue  donepezil   -Delirium precautions such as minimizing lines/restraints/Arzate etc; keeping lights on and blinds open during the day and off at night; frequent reorientation and family presence.    #Physical debility    -PT/OT    #Aspiration pneumonitis   #Dysphagia   -continue NGT with TFs.  Discussed with speech therapist.  Did slightly better with clinical swallow evaluation this morning.  Okay to start a pleasure feeds with pudding, ice cream after oral cares.  Video swallow study to be scheduled on Friday.   -oral care, PPI, asp precautions    #BPH with LUTS   -Arzate catheter removed in ICU   -continue finasteride, doxazosin   -bladder scan, arzate reinsertion if retention    #Distributive shock, resolved   #Chronic Afib    -Not on antihypertensives currently    #Acetabular fracture, chronicity unclear   -conservative mgmt    #Selective DNR/DNI   -Okay with NIPPV and pre arrest measures   -Will need to address artificial feeding pending dysphagia progress    Met with patient's daughter Peg and granddaughter.  Both are very hopeful that patient would get better.  Hoping to avoid need for PEG.  She has been informed that patient's prognosis is guarded.  She would want to continue life-prolonging treatments with the hope that patient can recover and eventually return home.  She was also made aware that palliative care service is available to assist family and making decisions on Ray's care if family is interested.  Will continue to address goals of care.      DVT Prophylaxis: lovenox, SCDs         DISCHARGE PLANNING     The patient's treatment plans were discussed with patient and family and RN.    Discharge Planning     Barriers to discharge: Patient is not medically ready and needs to remain in the hospital today due to ongoing pulmonary optimization, NPO status requiring NGT with TFs  Anticipated discharge destination: TBD - family has goal of getting patient home  Expected Discharge Date: 3/1        Discussed with  family at bedside.    Oswald Atkinson MD  2/23/2023 02/23/23   12:10 PM      Patient

## 2023-11-17 NOTE — ED PROVIDER NOTE - PATIENT PORTAL LINK FT
You can access the FollowMyHealth Patient Portal offered by [facility] by registering at the following website: [email or web address]. By joining [employer]’s FollowMyHealth portal, you will also be able to view your health information using other applications (apps) compatible with our system.

## 2023-11-17 NOTE — ED PROVIDER NOTE - PHYSICAL EXAMINATION
GEN: NAD, awake, eyes open spontaneously  EYES: normal conjunctiva, perrl, eomi, horizontal nystagmus bilaterally   ENT: NCAT, MMM, Trachea midline  CHEST/LUNGS: Non-tachypneic, CTAB, bilateral breath sounds  CARDIAC: Non-tachycardic, normal perfusion  ABDOMEN: Soft, NTND, No rebound/guarding  MSK: No edema, no gross deformity of extremities  SKIN: No rashes, no petechiae, no vesicles  NEURO: Ambulatory with normal gait, CN grossly intact, normal coordination, no pronator drift, no focal motor or sensory deficits  PSYCH: Alert, appropriate, cooperative, with capacity and insight

## 2023-11-17 NOTE — ED PROVIDER NOTE - NSFOLLOWUPINSTRUCTIONS_ED_ALL_ED_FT
1. You presented to the emergency department for: HEADACHE.     You had a head CT which did not show any abnormalities that could be causing this symptom.     2. Your evaluation in the emergency department included a physician evaluation. Your work-up did not reveal any findings indicating the need for admission to the hospital or any emergent interventions at this time.     3. It is recommended that you follow-up with NEUROLOGY as arranged by the discharge center for a repeat evaluation, and potentially further testing and treatment.     If needed, to arrange an appointment with a primary care provider please call: [phone]    4. Please continue taking your regular medications as prescribed.     For pain you may take 400-600 mg IBUPROFEN or 500-1000mg ACETAMINOPHEN every 6-8 hours - as needed.  This is an over-the-counter medication - please read the instructions for use and warnings on the label. If you have any questions regarding its use, you may refer them to your local pharmacist.    5. PLEASE RETURN TO THE EMERGENCY DEPARTMENT IMMEDIATELY IF you develop any fevers not responding to over the counter medications, uncontrollable nausea and vomiting, an inability to tolerate eating and drinking, difficulty breathing, chest pain, a severe increase in your symptoms or pain, or any other new symptoms that concern you.

## 2023-11-17 NOTE — ED ADULT NURSE NOTE - NSFALLUNIVINTERV_ED_ALL_ED
Bed/Stretcher in lowest position, wheels locked, appropriate side rails in place/Call bell, personal items and telephone in reach/Instruct patient to call for assistance before getting out of bed/chair/stretcher/Physically safe environment - no spills, clutter or unnecessary equipment/Purposeful proactive rounding/Room/bathroom lighting operational, light cord in reach

## 2023-11-17 NOTE — ED ADULT NURSE NOTE - OBJECTIVE STATEMENT
22 y/o F A&Ox4 with PMH of Syringoma and Anemia. Pt presents to the ED c/o headache since 8 pm. Pt endorses pain to the back of her head; reports pain is worse when she moves. Pt reports she took Advil around 9 pm with no relief in pain. Pt endorses fatigue and dizziness. Pt reports she has been having b/l leg swelling for the past few months. Denies chest pain, SOB, n/v/d, fever, chills, numbness, tingling, vision changes. Patient safety maintained, bed is in lowest position, wheels locked, and side rails raised.

## 2023-11-17 NOTE — ED PROVIDER NOTE - ATTENDING CONTRIBUTION TO CARE
Attending Contribution to Care: I, Dr. Nevaeh Mike, have personally performed a face to face medical and diagnostic evaluation of the patient. I have discussed with and reviewed the Resident's and/or ACP's and/or Medical/PA/NP student's note and agree with the History, ROS, Physical Exam and MDM unless otherwise indicated. A brief summary of my personal evaluation and impression can be found below.    HPI: 23 year old female with hx of syrinx presenting with headache. She reports several days of URI symptoms and ultimately started to have a throbbing occipital headache which progressed to the entire posterior scalp. Pain progressed to 10/10 in severity but despite resident documentation she states progressive, not sudden in onset, now 5/10 after taking Motrin. Patient with nonfocal neuro exam, neck supple, clear lungs, normal heart sounds. Will obtain CT as this is a new headache for her, will r/o ICH, although very low suspicion, pain control. Patient initially offered labs and IV analgesia but declined. Will give oral meds, CTH and reassess.

## 2023-11-17 NOTE — ED PROVIDER NOTE - OBJECTIVE STATEMENT
23 year old female with hx of syrinx presents to the ED complaining of sudden onset headache this evening at 8pm. pain is located posteriorly and worsens upon standing. Associated dizziness. Pain was 10/10 in severity, not improving with Advil. However, now pain 5/10 in severity. Denies hx of headaches or migraines. Also endorses recent cough and congestion, daughter sick at home with RSV. No fever, blurry vision, diplopia, numbness/tingling/weakness.

## 2023-11-21 ENCOUNTER — APPOINTMENT (OUTPATIENT)
Dept: INTERNAL MEDICINE | Facility: CLINIC | Age: 23
End: 2023-11-21
Payer: COMMERCIAL

## 2023-11-21 VITALS
DIASTOLIC BLOOD PRESSURE: 70 MMHG | SYSTOLIC BLOOD PRESSURE: 110 MMHG | BODY MASS INDEX: 20.58 KG/M2 | OXYGEN SATURATION: 99 % | HEART RATE: 92 BPM | TEMPERATURE: 97.5 F | WEIGHT: 109 LBS | HEIGHT: 61 IN

## 2023-11-21 DIAGNOSIS — R09.81 NASAL CONGESTION: ICD-10-CM

## 2023-11-21 DIAGNOSIS — R51.9 HEADACHE, UNSPECIFIED: ICD-10-CM

## 2023-11-21 DIAGNOSIS — K76.0 FATTY (CHANGE OF) LIVER, NOT ELSEWHERE CLASSIFIED: ICD-10-CM

## 2023-11-21 PROCEDURE — 99214 OFFICE O/P EST MOD 30 MIN: CPT

## 2023-11-21 RX ORDER — AMOXICILLIN AND CLAVULANATE POTASSIUM 875; 125 MG/1; MG/1
875-125 TABLET, COATED ORAL TWICE DAILY
Qty: 14 | Refills: 0 | Status: ACTIVE | COMMUNITY
Start: 2023-11-21 | End: 1900-01-01

## 2023-11-22 LAB
RAPID RVP RESULT: NOT DETECTED
SARS-COV-2 RNA PNL RESP NAA+PROBE: NOT DETECTED

## 2023-11-29 ENCOUNTER — APPOINTMENT (OUTPATIENT)
Dept: OBGYN | Facility: CLINIC | Age: 23
End: 2023-11-29
Payer: COMMERCIAL

## 2023-11-29 PROCEDURE — 99213 OFFICE O/P EST LOW 20 MIN: CPT

## 2023-12-07 ENCOUNTER — APPOINTMENT (OUTPATIENT)
Dept: OBGYN | Facility: CLINIC | Age: 23
End: 2023-12-07
Payer: COMMERCIAL

## 2023-12-07 VITALS
BODY MASS INDEX: 20.39 KG/M2 | HEIGHT: 61 IN | WEIGHT: 108 LBS | SYSTOLIC BLOOD PRESSURE: 100 MMHG | DIASTOLIC BLOOD PRESSURE: 64 MMHG

## 2023-12-07 PROCEDURE — 99203 OFFICE O/P NEW LOW 30 MIN: CPT

## 2023-12-17 NOTE — PLAN
[FreeTextEntry1] : Discussed the issues regarding endometriosis at length. Treatment options of surgical evaluation vs empiric therapy with ocp's, depoprovera and gnrh agonist therapy were discussed.  The risks and limitations of surgical evaluation and prolonged gnrh agonist therapy were discussed in detail.  ALSO DISCUSSED HER POSSIBLE VENOUS CONGESTION AND VARICOSITIES WHICH ARE LIKELY A RESULT OF PREGNANY.  DISCUSSED TX OPTIONS AT LENGTH POSSIBLE SURGERY VS HORMONAL MGT VS OBSERVATION IF ATTEMPTING PREGNANCY. During this visit 40 minutes were spent face-to-face with greater than 50% of the time dedicated to counseling.

## 2023-12-17 NOTE — HISTORY OF PRESENT ILLNESS
[FreeTextEntry1] : Patient is a 22yo P1 presenting for consultation regarding possible endometriosis. She reports having debilitating menses when she was younger that improved with OCP use. She d/c OCPs ~2mons ago to attempt pregnancy and the pain has returned (7/10 in severity). Menses also occur more frequently (q3wks vs q4wks). She also reports extreme dyspareunia and subsequent decreased drive. Of note, the patient has been experiencing bilateral LE, hip and lower back pain with pins and needles sensation that occurs constantly. She has also noticed more apparent veins and swelling in her LE for the past 6mons.   GYNhx: see above. Denies abnormal paps, stds, ovarian cyst or fibroids.  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 Denies otherwise no cancers, bleeding or clotting,

## 2024-01-04 ENCOUNTER — APPOINTMENT (OUTPATIENT)
Dept: RHEUMATOLOGY | Facility: CLINIC | Age: 24
End: 2024-01-04

## 2024-01-22 ENCOUNTER — APPOINTMENT (OUTPATIENT)
Dept: OBGYN | Facility: CLINIC | Age: 24
End: 2024-01-22

## 2024-03-12 ENCOUNTER — EMERGENCY (EMERGENCY)
Facility: HOSPITAL | Age: 24
LOS: 1 days | Discharge: HOME | End: 2024-03-12
Attending: EMERGENCY MEDICINE
Payer: COMMERCIAL

## 2024-03-12 VITALS
HEIGHT: 61 IN | RESPIRATION RATE: 18 BRPM | DIASTOLIC BLOOD PRESSURE: 73 MMHG | OXYGEN SATURATION: 99 % | HEART RATE: 99 BPM | TEMPERATURE: 98 F | SYSTOLIC BLOOD PRESSURE: 107 MMHG | WEIGHT: 102.07 LBS

## 2024-03-12 LAB
ALBUMIN SERPL ELPH-MCNC: 4.5 G/DL — SIGNIFICANT CHANGE UP (ref 3.3–5)
ALP SERPL-CCNC: 39 U/L — LOW (ref 40–120)
ALT FLD-CCNC: 22 U/L — SIGNIFICANT CHANGE UP (ref 10–45)
ANION GAP SERPL CALC-SCNC: 16 MMOL/L — SIGNIFICANT CHANGE UP (ref 5–17)
AST SERPL-CCNC: 20 U/L — SIGNIFICANT CHANGE UP (ref 10–40)
B-OH-BUTYR SERPL-SCNC: 1.3 MMOL/L — HIGH
BASE EXCESS BLDV CALC-SCNC: -2.5 MMOL/L — LOW (ref -2–3)
BASOPHILS # BLD AUTO: 0.04 K/UL — SIGNIFICANT CHANGE UP (ref 0–0.2)
BASOPHILS NFR BLD AUTO: 0.4 % — SIGNIFICANT CHANGE UP (ref 0–2)
BILIRUB SERPL-MCNC: 0.4 MG/DL — SIGNIFICANT CHANGE UP (ref 0.2–1.2)
BUN SERPL-MCNC: 13 MG/DL — SIGNIFICANT CHANGE UP (ref 7–23)
CA-I SERPL-SCNC: 1.26 MMOL/L — SIGNIFICANT CHANGE UP (ref 1.15–1.33)
CALCIUM SERPL-MCNC: 9.7 MG/DL — SIGNIFICANT CHANGE UP (ref 8.4–10.5)
CHLORIDE BLDV-SCNC: 101 MMOL/L — SIGNIFICANT CHANGE UP (ref 96–108)
CHLORIDE SERPL-SCNC: 102 MMOL/L — SIGNIFICANT CHANGE UP (ref 96–108)
CO2 BLDV-SCNC: 24 MMOL/L — SIGNIFICANT CHANGE UP (ref 22–26)
CO2 SERPL-SCNC: 20 MMOL/L — LOW (ref 22–31)
CREAT SERPL-MCNC: 0.5 MG/DL — SIGNIFICANT CHANGE UP (ref 0.5–1.3)
EGFR: 134 ML/MIN/1.73M2 — SIGNIFICANT CHANGE UP
EOSINOPHIL # BLD AUTO: 0.14 K/UL — SIGNIFICANT CHANGE UP (ref 0–0.5)
EOSINOPHIL NFR BLD AUTO: 1.3 % — SIGNIFICANT CHANGE UP (ref 0–6)
GAS PNL BLDV: 135 MMOL/L — LOW (ref 136–145)
GAS PNL BLDV: SIGNIFICANT CHANGE UP
GAS PNL BLDV: SIGNIFICANT CHANGE UP
GLUCOSE BLDV-MCNC: 69 MG/DL — LOW (ref 70–99)
GLUCOSE SERPL-MCNC: 73 MG/DL — SIGNIFICANT CHANGE UP (ref 70–99)
HCO3 BLDV-SCNC: 22 MMOL/L — SIGNIFICANT CHANGE UP (ref 22–29)
HCT VFR BLD CALC: 37.2 % — SIGNIFICANT CHANGE UP (ref 34.5–45)
HCT VFR BLDA CALC: 40 % — SIGNIFICANT CHANGE UP (ref 34.5–46.5)
HGB BLD CALC-MCNC: 13.2 G/DL — SIGNIFICANT CHANGE UP (ref 11.7–16.1)
HGB BLD-MCNC: 12.6 G/DL — SIGNIFICANT CHANGE UP (ref 11.5–15.5)
HOROWITZ INDEX BLDV+IHG-RTO: SIGNIFICANT CHANGE UP
IMM GRANULOCYTES NFR BLD AUTO: 0.2 % — SIGNIFICANT CHANGE UP (ref 0–0.9)
LACTATE BLDV-MCNC: 1.1 MMOL/L — SIGNIFICANT CHANGE UP (ref 0.5–2)
LIDOCAIN IGE QN: 13 U/L — SIGNIFICANT CHANGE UP (ref 7–60)
LYMPHOCYTES # BLD AUTO: 2.44 K/UL — SIGNIFICANT CHANGE UP (ref 1–3.3)
LYMPHOCYTES # BLD AUTO: 23.5 % — SIGNIFICANT CHANGE UP (ref 13–44)
MCHC RBC-ENTMCNC: 27.4 PG — SIGNIFICANT CHANGE UP (ref 27–34)
MCHC RBC-ENTMCNC: 33.9 GM/DL — SIGNIFICANT CHANGE UP (ref 32–36)
MCV RBC AUTO: 80.9 FL — SIGNIFICANT CHANGE UP (ref 80–100)
MONOCYTES # BLD AUTO: 0.6 K/UL — SIGNIFICANT CHANGE UP (ref 0–0.9)
MONOCYTES NFR BLD AUTO: 5.8 % — SIGNIFICANT CHANGE UP (ref 2–14)
NEUTROPHILS # BLD AUTO: 7.15 K/UL — SIGNIFICANT CHANGE UP (ref 1.8–7.4)
NEUTROPHILS NFR BLD AUTO: 68.8 % — SIGNIFICANT CHANGE UP (ref 43–77)
NRBC # BLD: 0 /100 WBCS — SIGNIFICANT CHANGE UP (ref 0–0)
PCO2 BLDV: 39 MMHG — SIGNIFICANT CHANGE UP (ref 39–42)
PH BLDV: 7.37 — SIGNIFICANT CHANGE UP (ref 7.32–7.43)
PLATELET # BLD AUTO: 240 K/UL — SIGNIFICANT CHANGE UP (ref 150–400)
PO2 BLDV: 33 MMHG — SIGNIFICANT CHANGE UP (ref 25–45)
POTASSIUM BLDV-SCNC: 4.3 MMOL/L — SIGNIFICANT CHANGE UP (ref 3.5–5.1)
POTASSIUM SERPL-MCNC: 3.8 MMOL/L — SIGNIFICANT CHANGE UP (ref 3.5–5.3)
POTASSIUM SERPL-SCNC: 3.8 MMOL/L — SIGNIFICANT CHANGE UP (ref 3.5–5.3)
PROT SERPL-MCNC: 7.2 G/DL — SIGNIFICANT CHANGE UP (ref 6–8.3)
RBC # BLD: 4.6 M/UL — SIGNIFICANT CHANGE UP (ref 3.8–5.2)
RBC # FLD: 13.2 % — SIGNIFICANT CHANGE UP (ref 10.3–14.5)
SAO2 % BLDV: 58.8 % — LOW (ref 67–88)
SODIUM SERPL-SCNC: 138 MMOL/L — SIGNIFICANT CHANGE UP (ref 135–145)
WBC # BLD: 10.39 K/UL — SIGNIFICANT CHANGE UP (ref 3.8–10.5)
WBC # FLD AUTO: 10.39 K/UL — SIGNIFICANT CHANGE UP (ref 3.8–10.5)

## 2024-03-12 PROCEDURE — 99284 EMERGENCY DEPT VISIT MOD MDM: CPT

## 2024-03-12 RX ORDER — THIAMINE MONONITRATE (VIT B1) 100 MG
100 TABLET ORAL ONCE
Refills: 0 | Status: COMPLETED | OUTPATIENT
Start: 2024-03-12 | End: 2024-03-12

## 2024-03-12 RX ORDER — SODIUM CHLORIDE 9 MG/ML
1000 INJECTION INTRAMUSCULAR; INTRAVENOUS; SUBCUTANEOUS ONCE
Refills: 0 | Status: COMPLETED | OUTPATIENT
Start: 2024-03-12 | End: 2024-03-12

## 2024-03-12 RX ORDER — ONDANSETRON 8 MG/1
4 TABLET, FILM COATED ORAL ONCE
Refills: 0 | Status: COMPLETED | OUTPATIENT
Start: 2024-03-12 | End: 2024-03-12

## 2024-03-12 RX ADMIN — SODIUM CHLORIDE 1000 MILLILITER(S): 9 INJECTION INTRAMUSCULAR; INTRAVENOUS; SUBCUTANEOUS at 23:14

## 2024-03-12 RX ADMIN — ONDANSETRON 4 MILLIGRAM(S): 8 TABLET, FILM COATED ORAL at 23:15

## 2024-03-12 NOTE — ED ADULT NURSE NOTE - NSFALLUNIVINTERV_ED_ALL_ED
Bed/Stretcher in lowest position, wheels locked, appropriate side rails in place/Call bell, personal items and telephone in reach/Instruct patient to call for assistance before getting out of bed/chair/stretcher/Non-slip footwear applied when patient is off stretcher/[place] to call system/Physically safe environment - no spills, clutter or unnecessary equipment/Purposeful proactive rounding/Room/bathroom lighting operational, light cord in reach

## 2024-03-12 NOTE — ED PROVIDER NOTE - PROGRESS NOTE DETAILS
Florentino Crenshaw DO (PGY-1) Patient reevaluated at bedside. Patient is still c/o nausea and RLQ is  TTP. US negative for appendicitis, RUQ US negative. Will obtain transvaginal US given patients TTP. Florentino Crenshaw DO (PGY-1) Patient continues to have severe nausea however notes it seems mildly improved. Will PO challenge. Patient was able to tolerate PO. Pt declined Reglan or Promethazine suppositories as she is anxious about introducing extra medications during pregnancy. She will continue on Zofran and Diclegis prescribed by her Gyn. Pt has mild right abdominal TTP on exam but only with palpation, subjectively she states "that's not my main issue". Ovaries, appendix and gallbladder, WNL, and given lack of subjective discomfort no indication for MRI abdomen. Small lymph node in the area of question seen on US abd. Will treat for bacturia given pregnancy. Pt requesting to go home. Return precautions discussed. MARIA C.

## 2024-03-12 NOTE — ED PROVIDER NOTE - CONSTITUTIONAL, MLM
normal... thin .pale  awake, alert, oriented to person, place, time/situation and in no apparent distress.

## 2024-03-12 NOTE — ED PROVIDER NOTE - OBJECTIVE STATEMENT
23-year-old female  history of hypothyroidism on levothyroxine   second pregnancy, last menstrual dated January 2 , has normal intrauterine 8-week pregnancy according to sonogram at her GYN office on Friday, seen at [facility] on Thursday for nausea and vomiting, was given IV Zofran and discharged home on oral Zofran, she is already on Diclegis , patient states that she is not able to eat anything unable to keep anything down also complaining of no bowel movements for couple of weeks, she denies any fever chills cold symptoms urinary frequency or urgency   GYN Dr. Aguirre

## 2024-03-12 NOTE — ED PROVIDER NOTE - PATIENT PORTAL LINK FT
You can access the FollowMyHealth Patient Portal offered by [facility] by registering at the following website: [email or web address]. By joining [facility]’s FollowMyHealth portal, you will also be able to view your health information using other applications (apps) compatible with our system.

## 2024-03-12 NOTE — ED PROVIDER NOTE - CARE PLAN
1 Principal Discharge DX:	Nausea and vomiting   Principal Discharge DX:	Hyperemesis gravidarum  Secondary Diagnosis:	Bacteria in urine   Principal Discharge DX:	Hyperemesis gravidarum  Secondary Diagnosis:	Bacteria in urine  Secondary Diagnosis:	Subchorionic hematoma in first trimester

## 2024-03-12 NOTE — ED ADULT NURSE NOTE - OBJECTIVE STATEMENT
23y Female presents to the ED c/o nausea. PMH Hypothyroidism on synthroid. Pt states this is her second pregnancy, she is 8 weeks pregnant and is experiencing "extreme nausea" Pt was seen at [facility] on Thursday where they gave her a bolus of NS through IV. Pt states her OB prescribed her Zofran on Friday but it isn't working. pt is unable to keep food down and synthroid down. Pt states her previous pregnancy she had iron deficiency anemia. Pt is A&Ox4, and ambulatory. Pt states she is weak from not being able to eat. Pt denies urinary symptoms, and vaginal bleeding. Respirations spontaneous, unlabored, and equal bilaterally. Patient safety maintained, bed is in lowest position, wheels locked, and side rails raised. Patient oriented to call bell, and call bell is within reach.

## 2024-03-12 NOTE — ED PROVIDER NOTE - CLINICAL SUMMARY MEDICAL DECISION MAKING FREE TEXT BOX
23-year-old female 8 weeks pregnant, with nausea vomiting , unable to keep anything  down   seen at [facility] on Thursday as well as her OB on Friday, has been on oral Zofran and Diclesis  . she is concerned that she cannot take your levothyroxine for thyroid issues , also has constipation for couple of weeks   physical exam is unremarkable , will obtain blood work, IV hydration, acetone level, Zofran, GYN consultation and reassess ZR

## 2024-03-13 VITALS
DIASTOLIC BLOOD PRESSURE: 60 MMHG | SYSTOLIC BLOOD PRESSURE: 96 MMHG | HEART RATE: 72 BPM | RESPIRATION RATE: 18 BRPM | OXYGEN SATURATION: 99 % | TEMPERATURE: 98 F

## 2024-03-13 LAB
APPEARANCE UR: CLEAR — SIGNIFICANT CHANGE UP
BACTERIA # UR AUTO: ABNORMAL /HPF
BILIRUB UR-MCNC: NEGATIVE — SIGNIFICANT CHANGE UP
CAST: 4 /LPF — SIGNIFICANT CHANGE UP (ref 0–4)
COLOR SPEC: YELLOW — SIGNIFICANT CHANGE UP
DIFF PNL FLD: NEGATIVE — SIGNIFICANT CHANGE UP
GLUCOSE UR QL: NEGATIVE MG/DL — SIGNIFICANT CHANGE UP
KETONES UR-MCNC: >=160 MG/DL
LEUKOCYTE ESTERASE UR-ACNC: ABNORMAL
NITRITE UR-MCNC: NEGATIVE — SIGNIFICANT CHANGE UP
PH UR: 6 — SIGNIFICANT CHANGE UP (ref 5–8)
PROT UR-MCNC: SIGNIFICANT CHANGE UP MG/DL
RBC CASTS # UR COMP ASSIST: 4 /HPF — SIGNIFICANT CHANGE UP (ref 0–4)
SP GR SPEC: >1.03 — HIGH (ref 1–1.03)
SQUAMOUS # UR AUTO: 9 /HPF — HIGH (ref 0–5)
T4 AB SER-ACNC: 12.7 UG/DL — HIGH (ref 4.6–12)
TSH SERPL-MCNC: 1.1 UIU/ML — SIGNIFICANT CHANGE UP (ref 0.27–4.2)
UROBILINOGEN FLD QL: 1 MG/DL — SIGNIFICANT CHANGE UP (ref 0.2–1)
WBC UR QL: 15 /HPF — HIGH (ref 0–5)

## 2024-03-13 PROCEDURE — 96374 THER/PROPH/DIAG INJ IV PUSH: CPT

## 2024-03-13 PROCEDURE — 83690 ASSAY OF LIPASE: CPT

## 2024-03-13 PROCEDURE — 84436 ASSAY OF TOTAL THYROXINE: CPT

## 2024-03-13 PROCEDURE — 76705 ECHO EXAM OF ABDOMEN: CPT | Mod: 26,59

## 2024-03-13 PROCEDURE — 82010 KETONE BODYS QUAN: CPT

## 2024-03-13 PROCEDURE — 76705 ECHO EXAM OF ABDOMEN: CPT

## 2024-03-13 PROCEDURE — 82330 ASSAY OF CALCIUM: CPT

## 2024-03-13 PROCEDURE — 87086 URINE CULTURE/COLONY COUNT: CPT

## 2024-03-13 PROCEDURE — 84132 ASSAY OF SERUM POTASSIUM: CPT

## 2024-03-13 PROCEDURE — 85018 HEMOGLOBIN: CPT

## 2024-03-13 PROCEDURE — 76801 OB US < 14 WKS SINGLE FETUS: CPT | Mod: 26

## 2024-03-13 PROCEDURE — 82435 ASSAY OF BLOOD CHLORIDE: CPT

## 2024-03-13 PROCEDURE — 93975 VASCULAR STUDY: CPT

## 2024-03-13 PROCEDURE — 93975 VASCULAR STUDY: CPT | Mod: 26

## 2024-03-13 PROCEDURE — 76801 OB US < 14 WKS SINGLE FETUS: CPT

## 2024-03-13 PROCEDURE — 76817 TRANSVAGINAL US OBSTETRIC: CPT

## 2024-03-13 PROCEDURE — 85025 COMPLETE CBC W/AUTO DIFF WBC: CPT

## 2024-03-13 PROCEDURE — 81001 URINALYSIS AUTO W/SCOPE: CPT

## 2024-03-13 PROCEDURE — 80053 COMPREHEN METABOLIC PANEL: CPT

## 2024-03-13 PROCEDURE — 82947 ASSAY GLUCOSE BLOOD QUANT: CPT

## 2024-03-13 PROCEDURE — 85014 HEMATOCRIT: CPT

## 2024-03-13 PROCEDURE — 84443 ASSAY THYROID STIM HORMONE: CPT

## 2024-03-13 PROCEDURE — 76817 TRANSVAGINAL US OBSTETRIC: CPT | Mod: 26

## 2024-03-13 PROCEDURE — 83605 ASSAY OF LACTIC ACID: CPT

## 2024-03-13 PROCEDURE — 84295 ASSAY OF SERUM SODIUM: CPT

## 2024-03-13 PROCEDURE — 82803 BLOOD GASES ANY COMBINATION: CPT

## 2024-03-13 PROCEDURE — 99285 EMERGENCY DEPT VISIT HI MDM: CPT | Mod: 25

## 2024-03-13 RX ORDER — CEPHALEXIN 500 MG
500 CAPSULE ORAL ONCE
Refills: 0 | Status: COMPLETED | OUTPATIENT
Start: 2024-03-13 | End: 2024-03-13

## 2024-03-13 RX ORDER — CEPHALEXIN 500 MG
1 CAPSULE ORAL
Qty: 20 | Refills: 0
Start: 2024-03-13 | End: 2024-03-17

## 2024-03-13 RX ORDER — SODIUM CHLORIDE 9 MG/ML
1000 INJECTION INTRAMUSCULAR; INTRAVENOUS; SUBCUTANEOUS ONCE
Refills: 0 | Status: COMPLETED | OUTPATIENT
Start: 2024-03-13 | End: 2024-03-13

## 2024-03-13 RX ORDER — ACETAMINOPHEN 500 MG
700 TABLET ORAL ONCE
Refills: 0 | Status: DISCONTINUED | OUTPATIENT
Start: 2024-03-13 | End: 2024-03-16

## 2024-03-13 RX ADMIN — Medication 500 MILLIGRAM(S): at 05:31

## 2024-03-13 RX ADMIN — SODIUM CHLORIDE 1000 MILLILITER(S): 9 INJECTION INTRAMUSCULAR; INTRAVENOUS; SUBCUTANEOUS at 02:35

## 2024-03-14 LAB
CULTURE RESULTS: NO GROWTH — SIGNIFICANT CHANGE UP
SPECIMEN SOURCE: SIGNIFICANT CHANGE UP

## 2024-03-15 ENCOUNTER — INPATIENT (INPATIENT)
Facility: HOSPITAL | Age: 24
LOS: 3 days | Discharge: HOME | DRG: 833 | End: 2024-03-19
Attending: OBSTETRICS & GYNECOLOGY | Admitting: OBSTETRICS & GYNECOLOGY
Payer: COMMERCIAL

## 2024-03-15 VITALS
TEMPERATURE: 97 F | RESPIRATION RATE: 16 BRPM | WEIGHT: 108.91 LBS | HEIGHT: 61 IN | HEART RATE: 93 BPM | SYSTOLIC BLOOD PRESSURE: 99 MMHG | OXYGEN SATURATION: 98 % | DIASTOLIC BLOOD PRESSURE: 67 MMHG

## 2024-03-15 DIAGNOSIS — O21.0 MILD HYPEREMESIS GRAVIDARUM: ICD-10-CM

## 2024-03-15 LAB
ADD ON TEST-SPECIMEN IN LAB: SIGNIFICANT CHANGE UP
ALBUMIN SERPL ELPH-MCNC: 4.5 G/DL — SIGNIFICANT CHANGE UP (ref 3.3–5)
ALP SERPL-CCNC: 38 U/L — LOW (ref 40–120)
ALT FLD-CCNC: 18 U/L — SIGNIFICANT CHANGE UP (ref 10–45)
AMYLASE P1 CFR SERPL: 41 U/L — SIGNIFICANT CHANGE UP (ref 25–125)
ANION GAP SERPL CALC-SCNC: 21 MMOL/L — HIGH (ref 5–17)
AST SERPL-CCNC: 26 U/L — SIGNIFICANT CHANGE UP (ref 10–40)
B-OH-BUTYR SERPL-SCNC: 3.9 MMOL/L — HIGH
BASE EXCESS BLDV CALC-SCNC: -11.8 MMOL/L — LOW (ref -2–3)
BASOPHILS # BLD AUTO: 0.04 K/UL — SIGNIFICANT CHANGE UP (ref 0–0.2)
BASOPHILS NFR BLD AUTO: 0.4 % — SIGNIFICANT CHANGE UP (ref 0–2)
BILIRUB SERPL-MCNC: 0.4 MG/DL — SIGNIFICANT CHANGE UP (ref 0.2–1.2)
BUN SERPL-MCNC: 10 MG/DL — SIGNIFICANT CHANGE UP (ref 7–23)
CA-I SERPL-SCNC: 1.28 MMOL/L — SIGNIFICANT CHANGE UP (ref 1.15–1.33)
CALCIUM SERPL-MCNC: 9.5 MG/DL — SIGNIFICANT CHANGE UP (ref 8.4–10.5)
CHLORIDE BLDV-SCNC: 105 MMOL/L — SIGNIFICANT CHANGE UP (ref 96–108)
CHLORIDE SERPL-SCNC: 103 MMOL/L — SIGNIFICANT CHANGE UP (ref 96–108)
CO2 BLDV-SCNC: 16 MMOL/L — LOW (ref 22–26)
CO2 SERPL-SCNC: 13 MMOL/L — LOW (ref 22–31)
CREAT SERPL-MCNC: 0.5 MG/DL — SIGNIFICANT CHANGE UP (ref 0.5–1.3)
EGFR: 134 ML/MIN/1.73M2 — SIGNIFICANT CHANGE UP
EOSINOPHIL # BLD AUTO: 0.02 K/UL — SIGNIFICANT CHANGE UP (ref 0–0.5)
EOSINOPHIL NFR BLD AUTO: 0.2 % — SIGNIFICANT CHANGE UP (ref 0–6)
GAS PNL BLDV: 133 MMOL/L — LOW (ref 136–145)
GAS PNL BLDV: SIGNIFICANT CHANGE UP
GLUCOSE BLDV-MCNC: 57 MG/DL — LOW (ref 70–99)
GLUCOSE SERPL-MCNC: 60 MG/DL — LOW (ref 70–99)
HCO3 BLDV-SCNC: 15 MMOL/L — LOW (ref 22–29)
HCT VFR BLD CALC: 36.7 % — SIGNIFICANT CHANGE UP (ref 34.5–45)
HCT VFR BLDA CALC: 39 % — SIGNIFICANT CHANGE UP (ref 34.5–46.5)
HGB BLD CALC-MCNC: 12.9 G/DL — SIGNIFICANT CHANGE UP (ref 11.7–16.1)
HGB BLD-MCNC: 12.5 G/DL — SIGNIFICANT CHANGE UP (ref 11.5–15.5)
IMM GRANULOCYTES NFR BLD AUTO: 0.3 % — SIGNIFICANT CHANGE UP (ref 0–0.9)
LACTATE BLDV-MCNC: 1 MMOL/L — SIGNIFICANT CHANGE UP (ref 0.5–2)
LIDOCAIN IGE QN: 18 U/L — SIGNIFICANT CHANGE UP (ref 7–60)
LYMPHOCYTES # BLD AUTO: 1.29 K/UL — SIGNIFICANT CHANGE UP (ref 1–3.3)
LYMPHOCYTES # BLD AUTO: 13.3 % — SIGNIFICANT CHANGE UP (ref 13–44)
MCHC RBC-ENTMCNC: 27.4 PG — SIGNIFICANT CHANGE UP (ref 27–34)
MCHC RBC-ENTMCNC: 34.1 GM/DL — SIGNIFICANT CHANGE UP (ref 32–36)
MCV RBC AUTO: 80.3 FL — SIGNIFICANT CHANGE UP (ref 80–100)
MONOCYTES # BLD AUTO: 0.38 K/UL — SIGNIFICANT CHANGE UP (ref 0–0.9)
MONOCYTES NFR BLD AUTO: 3.9 % — SIGNIFICANT CHANGE UP (ref 2–14)
NEUTROPHILS # BLD AUTO: 7.96 K/UL — HIGH (ref 1.8–7.4)
NEUTROPHILS NFR BLD AUTO: 81.9 % — HIGH (ref 43–77)
NRBC # BLD: 0 /100 WBCS — SIGNIFICANT CHANGE UP (ref 0–0)
PCO2 BLDV: 34 MMHG — LOW (ref 39–42)
PH BLDV: 7.24 — LOW (ref 7.32–7.43)
PLATELET # BLD AUTO: 241 K/UL — SIGNIFICANT CHANGE UP (ref 150–400)
PO2 BLDV: 41 MMHG — SIGNIFICANT CHANGE UP (ref 25–45)
POTASSIUM BLDV-SCNC: 3.9 MMOL/L — SIGNIFICANT CHANGE UP (ref 3.5–5.1)
POTASSIUM SERPL-MCNC: 3.8 MMOL/L — SIGNIFICANT CHANGE UP (ref 3.5–5.3)
POTASSIUM SERPL-SCNC: 3.8 MMOL/L — SIGNIFICANT CHANGE UP (ref 3.5–5.3)
PROT SERPL-MCNC: 7.4 G/DL — SIGNIFICANT CHANGE UP (ref 6–8.3)
RBC # BLD: 4.57 M/UL — SIGNIFICANT CHANGE UP (ref 3.8–5.2)
RBC # FLD: 13.1 % — SIGNIFICANT CHANGE UP (ref 10.3–14.5)
SAO2 % BLDV: 69.8 % — SIGNIFICANT CHANGE UP (ref 67–88)
SODIUM SERPL-SCNC: 137 MMOL/L — SIGNIFICANT CHANGE UP (ref 135–145)
WBC # BLD: 9.72 K/UL — SIGNIFICANT CHANGE UP (ref 3.8–10.5)
WBC # FLD AUTO: 9.72 K/UL — SIGNIFICANT CHANGE UP (ref 3.8–10.5)

## 2024-03-15 PROCEDURE — 99285 EMERGENCY DEPT VISIT HI MDM: CPT

## 2024-03-15 RX ORDER — METOCLOPRAMIDE HCL 10 MG
10 TABLET ORAL EVERY 6 HOURS
Refills: 0 | Status: DISCONTINUED | OUTPATIENT
Start: 2024-03-15 | End: 2024-03-17

## 2024-03-15 RX ORDER — SODIUM CHLORIDE 9 MG/ML
1000 INJECTION, SOLUTION INTRAVENOUS
Refills: 0 | Status: COMPLETED | OUTPATIENT
Start: 2024-03-15 | End: 2024-03-15

## 2024-03-15 RX ORDER — THIAMINE MONONITRATE (VIT B1) 100 MG
100 TABLET ORAL DAILY
Refills: 0 | Status: DISCONTINUED | OUTPATIENT
Start: 2024-03-15 | End: 2024-03-18

## 2024-03-15 RX ORDER — HEPARIN SODIUM 5000 [USP'U]/ML
5000 INJECTION INTRAVENOUS; SUBCUTANEOUS EVERY 12 HOURS
Refills: 0 | Status: DISCONTINUED | OUTPATIENT
Start: 2024-03-15 | End: 2024-03-19

## 2024-03-15 RX ORDER — FOLIC ACID 0.8 MG
1 TABLET ORAL DAILY
Refills: 0 | Status: DISCONTINUED | OUTPATIENT
Start: 2024-03-15 | End: 2024-03-19

## 2024-03-15 RX ORDER — LEVOTHYROXINE SODIUM 125 MCG
75 TABLET ORAL DAILY
Refills: 0 | Status: DISCONTINUED | OUTPATIENT
Start: 2024-03-15 | End: 2024-03-19

## 2024-03-15 RX ORDER — ONDANSETRON 8 MG/1
4 TABLET, FILM COATED ORAL ONCE
Refills: 0 | Status: COMPLETED | OUTPATIENT
Start: 2024-03-15 | End: 2024-03-15

## 2024-03-15 RX ORDER — SODIUM CHLORIDE 9 MG/ML
1000 INJECTION, SOLUTION INTRAVENOUS
Refills: 0 | Status: DISCONTINUED | OUTPATIENT
Start: 2024-03-15 | End: 2024-03-16

## 2024-03-15 RX ORDER — ONDANSETRON 8 MG/1
4 TABLET, FILM COATED ORAL EVERY 6 HOURS
Refills: 0 | Status: DISCONTINUED | OUTPATIENT
Start: 2024-03-15 | End: 2024-03-18

## 2024-03-15 RX ORDER — SODIUM CHLORIDE 9 MG/ML
1000 INJECTION INTRAMUSCULAR; INTRAVENOUS; SUBCUTANEOUS ONCE
Refills: 0 | Status: COMPLETED | OUTPATIENT
Start: 2024-03-15 | End: 2024-03-15

## 2024-03-15 RX ORDER — SENNA PLUS 8.6 MG/1
1 TABLET ORAL DAILY
Refills: 0 | Status: DISCONTINUED | OUTPATIENT
Start: 2024-03-15 | End: 2024-03-18

## 2024-03-15 RX ORDER — PANTOPRAZOLE SODIUM 20 MG/1
40 TABLET, DELAYED RELEASE ORAL DAILY
Refills: 0 | Status: DISCONTINUED | OUTPATIENT
Start: 2024-03-15 | End: 2024-03-18

## 2024-03-15 RX ORDER — DEXTROSE 50 % IN WATER 50 %
25 SYRINGE (ML) INTRAVENOUS ONCE
Refills: 0 | Status: COMPLETED | OUTPATIENT
Start: 2024-03-15 | End: 2024-03-15

## 2024-03-15 RX ORDER — FAMOTIDINE 10 MG/ML
20 INJECTION INTRAVENOUS ONCE
Refills: 0 | Status: COMPLETED | OUTPATIENT
Start: 2024-03-15 | End: 2024-03-15

## 2024-03-15 RX ORDER — DIPHENHYDRAMINE HCL 50 MG
25 CAPSULE ORAL ONCE
Refills: 0 | Status: COMPLETED | OUTPATIENT
Start: 2024-03-15 | End: 2024-03-15

## 2024-03-15 RX ORDER — PYRIDOXINE HCL (VITAMIN B6) 100 MG
25 TABLET ORAL EVERY 12 HOURS
Refills: 0 | Status: DISCONTINUED | OUTPATIENT
Start: 2024-03-15 | End: 2024-03-16

## 2024-03-15 RX ADMIN — SODIUM CHLORIDE 150 MILLILITER(S): 9 INJECTION, SOLUTION INTRAVENOUS at 19:42

## 2024-03-15 RX ADMIN — SODIUM CHLORIDE 1000 MILLILITER(S): 9 INJECTION INTRAMUSCULAR; INTRAVENOUS; SUBCUTANEOUS at 18:59

## 2024-03-15 RX ADMIN — SODIUM CHLORIDE 1000 MILLILITER(S): 9 INJECTION INTRAMUSCULAR; INTRAVENOUS; SUBCUTANEOUS at 17:16

## 2024-03-15 RX ADMIN — PANTOPRAZOLE SODIUM 40 MILLIGRAM(S): 20 TABLET, DELAYED RELEASE ORAL at 19:26

## 2024-03-15 RX ADMIN — Medication 25 GRAM(S): at 19:28

## 2024-03-15 RX ADMIN — FAMOTIDINE 20 MILLIGRAM(S): 10 INJECTION INTRAVENOUS at 17:12

## 2024-03-15 RX ADMIN — Medication 25 MILLIGRAM(S): at 17:11

## 2024-03-15 RX ADMIN — ONDANSETRON 4 MILLIGRAM(S): 8 TABLET, FILM COATED ORAL at 17:11

## 2024-03-15 RX ADMIN — Medication 10 MILLIGRAM(S): at 20:31

## 2024-03-15 RX ADMIN — ONDANSETRON 4 MILLIGRAM(S): 8 TABLET, FILM COATED ORAL at 19:27

## 2024-03-15 NOTE — H&P ADULT - ASSESSMENT
23y  OK Oct 19 2024 8w6d presents from Dr. Good office for admission for hyperemesis gravidarum.     Neuro: no pain noted, pt with some dizziness; improvement with IV fluid hydration  CV: Hemodynamically stable  Pulm: Saturating well on room air. Encourage ambulation.   GI: Continue regular diet.   - Zofran 4 mg IV q6hrs prn, Reglan 10 mg IV q6hrs prn, Phenergan Suppository 25 mg BID   - f/u amylase, lipase, and thyroid panel to assess for other causes of nausea/vomiting in AM  :  Voding spontaneously.   Heme: early ambulation   FEN:   - IV fluid repletion with vitamin additives: Thiamine 100mg IV push daily, 0.2 mg folic acid IV push daily.  ID: Afebrile  Endo: No active issues  Dispo: Continue inpatient management of hyperemesis gravidarum    Abhi Avalos PGY 2

## 2024-03-15 NOTE — ED PROVIDER NOTE - ENMT, MLM
Airway patent, Nasal mucosa clear. Mouth with dry mucosa Throat has no vesicles, no oropharyngeal exudates and uvula is midline.

## 2024-03-15 NOTE — ED ADULT NURSE NOTE - NSFALLUNIVINTERV_ED_ALL_ED
Bed/Stretcher in lowest position, wheels locked, appropriate side rails in place/Call bell, personal items and telephone in reach/Instruct patient to call for assistance before getting out of bed/chair/stretcher/Non-slip footwear applied when patient is off stretcher/[place] to call system/Physically safe environment - no spills, clutter or unnecessary equipment/Purposeful proactive rounding/Room/bathroom lighting operational, light cord in reach

## 2024-03-15 NOTE — ED PROVIDER NOTE - CONSTITUTIONAL, MLM
ill appearing, awake, alert, oriented to person, place, time/situation and in no apparent distress. normal...

## 2024-03-15 NOTE — ED PROVIDER NOTE - ATTENDING CONTRIBUTION TO CARE
Patient is a 23-year-old female G2, P1 presenting with hyperemesis for the past month been followed up with Dr. Aguirre OB/GYN. Patient with repeat ER visits.  Yesterday saw had home nursing come and see her who placed an IV and gave her fluids and Zofran but still is vomiting multiple times a day for the feels very weak lightheaded has been in her room because even smells make her vomit and trying multiple different outpatient occluding Zofran IV shots as well as supposed to start Reglan today but..  Patient states she has not had a bowel movement in a month and has no abdominal pain no fevers or chills patient was sent in by OB/GYN likely admission IV fluids antiemetics labs discussed with the resident.

## 2024-03-15 NOTE — H&P ADULT - NSHPPHYSICALEXAM_GEN_ALL_CORE
Vital Signs Last 24 Hrs  T(C): 36.3 (15 Mar 2024 15:32), Max: 36.3 (15 Mar 2024 15:32)  T(F): 97.4 (15 Mar 2024 15:32), Max: 97.4 (15 Mar 2024 15:32)  HR: 93 (15 Mar 2024 15:32) (93 - 93)  BP: 99/67 (15 Mar 2024 15:32) (99/67 - 99/67)  BP(mean): --  RR: 16 (15 Mar 2024 15:32) (16 - 16)  SpO2: 98% (15 Mar 2024 15:32) (98% - 98%)    Parameters below as of 15 Mar 2024 15:32  Patient On (Oxygen Delivery Method): room air        Physical Exam:   General: sitting comfortably in bed, NAD   Lungs: breathing comfortably on RA

## 2024-03-15 NOTE — H&P ADULT - HISTORY OF PRESENT ILLNESS
GABRIELE REECE  23y  Female [id number]    HPI:  23y  OK Oct 19 2024 8w6d presents from Dr. Good office for admission for hyperemesis gravidarum. Nausea and vomiting started three weeks ago and patient have 4 or more bouts of emesis in the day. Has not eaten in 2 days and last sip of water this morning which she threw up. Had a syncopal episode today but  at her side and she did not fall or hit her head. Has trialed Diclegis and Zofran with no resolution of symptoms and has IV tx at home set up starting yesterday.    ROS: negative for fever or abdominal pain, denies vaginal bleeding   states some dizziness, 3 weeks without bowel movement    Name of GYN Physician: Dr. Good  OBHx:  FT    GynHx: Denies fibroids, cysts, endometriosis, STI's, Abnormal pap smears   PMHx: Hypothyroid, Anemia requiring iron transfusions, syringomyelia, fatty liver  Medications: PNV, Synthroid 75mcg daily, Diclegis, Zofran  Allergies: NKDA, citrus/kiwi/pineapple->angioedema  PSHx: Denies

## 2024-03-15 NOTE — H&P ADULT - NSHPLABSRESULTS_GEN_ALL_CORE
LABS:                              12.5   9.72  )-----------( 241      ( 15 Mar 2024 17:29 )             36.7     03-15    137  |  103  |  10  ----------------------------<  60<L>  3.8   |  13<L>  |  0.50    Ca    9.5      15 Mar 2024 17:29    TPro  7.4  /  Alb  4.5  /  TBili  0.4  /  DBili  x   /  AST  26  /  ALT  18  /  AlkPhos  38<L>  03-15

## 2024-03-15 NOTE — ED PROCEDURE NOTE - PROCEDURE ADDITIONAL DETAILS
Emergency Department Focused Ultrasound performed at patient's bedside for educational purposes. The study will have a follow up study performed or was performed in the direct supervision of an ultrasound trained attending.    FHR 161bpm    F/u doppler shows

## 2024-03-15 NOTE — ED ADULT NURSE NOTE - OBJECTIVE STATEMENT
Patient  is  alert  an d oriented x4. Color is  good and skin warm to touch.  She  is  c.o nausea, vomiting and  general weakness.  Patient  is  4  weeks pregnant.

## 2024-03-16 LAB
ALBUMIN SERPL ELPH-MCNC: 3.9 G/DL — SIGNIFICANT CHANGE UP (ref 3.3–5)
ALP SERPL-CCNC: 33 U/L — LOW (ref 40–120)
ALT FLD-CCNC: 14 U/L — SIGNIFICANT CHANGE UP (ref 10–45)
ANION GAP SERPL CALC-SCNC: 14 MMOL/L — SIGNIFICANT CHANGE UP (ref 5–17)
APPEARANCE UR: CLEAR — SIGNIFICANT CHANGE UP
AST SERPL-CCNC: 21 U/L — SIGNIFICANT CHANGE UP (ref 10–40)
BILIRUB SERPL-MCNC: 0.5 MG/DL — SIGNIFICANT CHANGE UP (ref 0.2–1.2)
BILIRUB UR-MCNC: NEGATIVE — SIGNIFICANT CHANGE UP
BUN SERPL-MCNC: 6 MG/DL — LOW (ref 7–23)
CALCIUM SERPL-MCNC: 9.1 MG/DL — SIGNIFICANT CHANGE UP (ref 8.4–10.5)
CHLORIDE SERPL-SCNC: 108 MMOL/L — SIGNIFICANT CHANGE UP (ref 96–108)
CO2 SERPL-SCNC: 15 MMOL/L — LOW (ref 22–31)
COLOR SPEC: YELLOW — SIGNIFICANT CHANGE UP
CREAT SERPL-MCNC: 0.46 MG/DL — LOW (ref 0.5–1.3)
DIFF PNL FLD: NEGATIVE — SIGNIFICANT CHANGE UP
EGFR: 137 ML/MIN/1.73M2 — SIGNIFICANT CHANGE UP
GLUCOSE SERPL-MCNC: 65 MG/DL — LOW (ref 70–99)
GLUCOSE UR QL: NEGATIVE MG/DL — SIGNIFICANT CHANGE UP
HCT VFR BLD CALC: 31.9 % — LOW (ref 34.5–45)
HGB BLD-MCNC: 10.8 G/DL — LOW (ref 11.5–15.5)
KETONES UR-MCNC: 40 MG/DL
LEUKOCYTE ESTERASE UR-ACNC: NEGATIVE — SIGNIFICANT CHANGE UP
MCHC RBC-ENTMCNC: 27.2 PG — SIGNIFICANT CHANGE UP (ref 27–34)
MCHC RBC-ENTMCNC: 33.9 GM/DL — SIGNIFICANT CHANGE UP (ref 32–36)
MCV RBC AUTO: 80.4 FL — SIGNIFICANT CHANGE UP (ref 80–100)
NITRITE UR-MCNC: NEGATIVE — SIGNIFICANT CHANGE UP
NRBC # BLD: 0 /100 WBCS — SIGNIFICANT CHANGE UP (ref 0–0)
PH UR: 6 — SIGNIFICANT CHANGE UP (ref 5–8)
PLATELET # BLD AUTO: 235 K/UL — SIGNIFICANT CHANGE UP (ref 150–400)
POTASSIUM SERPL-MCNC: 3.3 MMOL/L — LOW (ref 3.5–5.3)
POTASSIUM SERPL-SCNC: 3.3 MMOL/L — LOW (ref 3.5–5.3)
PROT SERPL-MCNC: 6.2 G/DL — SIGNIFICANT CHANGE UP (ref 6–8.3)
PROT UR-MCNC: NEGATIVE MG/DL — SIGNIFICANT CHANGE UP
RBC # BLD: 3.97 M/UL — SIGNIFICANT CHANGE UP (ref 3.8–5.2)
RBC # FLD: 13.2 % — SIGNIFICANT CHANGE UP (ref 10.3–14.5)
SODIUM SERPL-SCNC: 137 MMOL/L — SIGNIFICANT CHANGE UP (ref 135–145)
SP GR SPEC: 1.01 — SIGNIFICANT CHANGE UP (ref 1–1.03)
T4 AB SER-ACNC: 9.9 UG/DL — SIGNIFICANT CHANGE UP (ref 4.6–12)
TSH SERPL-MCNC: 3.21 UIU/ML — SIGNIFICANT CHANGE UP (ref 0.27–4.2)
UROBILINOGEN FLD QL: 0.2 MG/DL — SIGNIFICANT CHANGE UP (ref 0.2–1)
WBC # BLD: 7.75 K/UL — SIGNIFICANT CHANGE UP (ref 3.8–10.5)
WBC # FLD AUTO: 7.75 K/UL — SIGNIFICANT CHANGE UP (ref 3.8–10.5)

## 2024-03-16 RX ORDER — POTASSIUM CHLORIDE 20 MEQ
10 PACKET (EA) ORAL
Refills: 0 | Status: COMPLETED | OUTPATIENT
Start: 2024-03-16 | End: 2024-03-16

## 2024-03-16 RX ORDER — SODIUM CHLORIDE 9 MG/ML
1000 INJECTION, SOLUTION INTRAVENOUS
Refills: 0 | Status: DISCONTINUED | OUTPATIENT
Start: 2024-03-16 | End: 2024-03-16

## 2024-03-16 RX ORDER — DIPHENHYDRAMINE HCL 50 MG
25 CAPSULE ORAL EVERY 8 HOURS
Refills: 0 | Status: DISCONTINUED | OUTPATIENT
Start: 2024-03-16 | End: 2024-03-17

## 2024-03-16 RX ORDER — SODIUM CHLORIDE 9 MG/ML
1000 INJECTION, SOLUTION INTRAVENOUS
Refills: 0 | Status: DISCONTINUED | OUTPATIENT
Start: 2024-03-16 | End: 2024-03-18

## 2024-03-16 RX ORDER — PYRIDOXINE HCL (VITAMIN B6) 100 MG
25 TABLET ORAL
Refills: 0 | Status: DISCONTINUED | OUTPATIENT
Start: 2024-03-16 | End: 2024-03-19

## 2024-03-16 RX ORDER — FOLIC ACID 0.8 MG
1 TABLET ORAL DAILY
Refills: 0 | Status: DISCONTINUED | OUTPATIENT
Start: 2024-03-16 | End: 2024-03-16

## 2024-03-16 RX ORDER — POLYETHYLENE GLYCOL 3350 17 G/17G
119 POWDER, FOR SOLUTION ORAL ONCE
Refills: 0 | Status: COMPLETED | OUTPATIENT
Start: 2024-03-16 | End: 2024-03-17

## 2024-03-16 RX ORDER — THIAMINE MONONITRATE (VIT B1) 100 MG
100 TABLET ORAL DAILY
Refills: 0 | Status: DISCONTINUED | OUTPATIENT
Start: 2024-03-16 | End: 2024-03-16

## 2024-03-16 RX ORDER — METOCLOPRAMIDE HCL 10 MG
10 TABLET ORAL EVERY 6 HOURS
Refills: 0 | Status: DISCONTINUED | OUTPATIENT
Start: 2024-03-16 | End: 2024-03-17

## 2024-03-16 RX ADMIN — HEPARIN SODIUM 5000 UNIT(S): 5000 INJECTION INTRAVENOUS; SUBCUTANEOUS at 20:36

## 2024-03-16 RX ADMIN — ONDANSETRON 4 MILLIGRAM(S): 8 TABLET, FILM COATED ORAL at 12:14

## 2024-03-16 RX ADMIN — ONDANSETRON 4 MILLIGRAM(S): 8 TABLET, FILM COATED ORAL at 18:47

## 2024-03-16 RX ADMIN — ONDANSETRON 4 MILLIGRAM(S): 8 TABLET, FILM COATED ORAL at 00:08

## 2024-03-16 RX ADMIN — Medication 10 MILLIGRAM(S): at 12:14

## 2024-03-16 RX ADMIN — Medication 75 MICROGRAM(S): at 06:16

## 2024-03-16 RX ADMIN — Medication 100 MILLIGRAM(S): at 16:05

## 2024-03-16 RX ADMIN — Medication 1 MILLIGRAM(S): at 16:06

## 2024-03-16 RX ADMIN — ONDANSETRON 4 MILLIGRAM(S): 8 TABLET, FILM COATED ORAL at 06:15

## 2024-03-16 RX ADMIN — PANTOPRAZOLE SODIUM 40 MILLIGRAM(S): 20 TABLET, DELAYED RELEASE ORAL at 12:14

## 2024-03-16 RX ADMIN — SODIUM CHLORIDE 125 MILLILITER(S): 9 INJECTION, SOLUTION INTRAVENOUS at 18:48

## 2024-03-17 LAB
ALBUMIN SERPL ELPH-MCNC: 3.6 G/DL — SIGNIFICANT CHANGE UP (ref 3.3–5)
ALP SERPL-CCNC: 29 U/L — LOW (ref 40–120)
ALT FLD-CCNC: 13 U/L — SIGNIFICANT CHANGE UP (ref 10–45)
ANION GAP SERPL CALC-SCNC: 12 MMOL/L — SIGNIFICANT CHANGE UP (ref 5–17)
AST SERPL-CCNC: 18 U/L — SIGNIFICANT CHANGE UP (ref 10–40)
BILIRUB SERPL-MCNC: 0.4 MG/DL — SIGNIFICANT CHANGE UP (ref 0.2–1.2)
BUN SERPL-MCNC: <4 MG/DL — LOW (ref 7–23)
CALCIUM SERPL-MCNC: 8.8 MG/DL — SIGNIFICANT CHANGE UP (ref 8.4–10.5)
CHLORIDE SERPL-SCNC: 106 MMOL/L — SIGNIFICANT CHANGE UP (ref 96–108)
CO2 SERPL-SCNC: 19 MMOL/L — LOW (ref 22–31)
CREAT SERPL-MCNC: 0.41 MG/DL — LOW (ref 0.5–1.3)
EGFR: 141 ML/MIN/1.73M2 — SIGNIFICANT CHANGE UP
GLUCOSE SERPL-MCNC: 97 MG/DL — SIGNIFICANT CHANGE UP (ref 70–99)
HCT VFR BLD CALC: 27.5 % — LOW (ref 34.5–45)
HGB BLD-MCNC: 9.8 G/DL — LOW (ref 11.5–15.5)
MCHC RBC-ENTMCNC: 27.9 PG — SIGNIFICANT CHANGE UP (ref 27–34)
MCHC RBC-ENTMCNC: 35.6 GM/DL — SIGNIFICANT CHANGE UP (ref 32–36)
MCV RBC AUTO: 78.3 FL — LOW (ref 80–100)
NRBC # BLD: 0 /100 WBCS — SIGNIFICANT CHANGE UP (ref 0–0)
PLATELET # BLD AUTO: 170 K/UL — SIGNIFICANT CHANGE UP (ref 150–400)
POTASSIUM SERPL-MCNC: 2.9 MMOL/L — CRITICAL LOW (ref 3.5–5.3)
POTASSIUM SERPL-SCNC: 2.9 MMOL/L — CRITICAL LOW (ref 3.5–5.3)
PROT SERPL-MCNC: 5.8 G/DL — LOW (ref 6–8.3)
RBC # BLD: 3.51 M/UL — LOW (ref 3.8–5.2)
RBC # FLD: 13.2 % — SIGNIFICANT CHANGE UP (ref 10.3–14.5)
SODIUM SERPL-SCNC: 137 MMOL/L — SIGNIFICANT CHANGE UP (ref 135–145)
WBC # BLD: 6.25 K/UL — SIGNIFICANT CHANGE UP (ref 3.8–10.5)
WBC # FLD AUTO: 6.25 K/UL — SIGNIFICANT CHANGE UP (ref 3.8–10.5)

## 2024-03-17 RX ORDER — DIPHENHYDRAMINE HCL 50 MG
25 CAPSULE ORAL EVERY 8 HOURS
Refills: 0 | Status: DISCONTINUED | OUTPATIENT
Start: 2024-03-17 | End: 2024-03-18

## 2024-03-17 RX ORDER — POTASSIUM CHLORIDE 20 MEQ
10 PACKET (EA) ORAL ONCE
Refills: 0 | Status: COMPLETED | OUTPATIENT
Start: 2024-03-17 | End: 2024-03-17

## 2024-03-17 RX ORDER — METOCLOPRAMIDE HCL 10 MG
10 TABLET ORAL EVERY 6 HOURS
Refills: 0 | Status: DISCONTINUED | OUTPATIENT
Start: 2024-03-17 | End: 2024-03-18

## 2024-03-17 RX ORDER — POTASSIUM CHLORIDE 20 MEQ
40 PACKET (EA) ORAL EVERY 4 HOURS
Refills: 0 | Status: COMPLETED | OUTPATIENT
Start: 2024-03-17 | End: 2024-03-17

## 2024-03-17 RX ORDER — POTASSIUM CHLORIDE 20 MEQ
20 PACKET (EA) ORAL
Refills: 0 | Status: COMPLETED | OUTPATIENT
Start: 2024-03-17 | End: 2024-03-17

## 2024-03-17 RX ADMIN — Medication 1 MILLIGRAM(S): at 12:10

## 2024-03-17 RX ADMIN — Medication 75 MICROGRAM(S): at 05:57

## 2024-03-17 RX ADMIN — Medication 25 MILLIGRAM(S): at 17:21

## 2024-03-17 RX ADMIN — ONDANSETRON 4 MILLIGRAM(S): 8 TABLET, FILM COATED ORAL at 19:54

## 2024-03-17 RX ADMIN — Medication 25 MILLIGRAM(S): at 09:01

## 2024-03-17 RX ADMIN — Medication 10 MILLIGRAM(S): at 23:42

## 2024-03-17 RX ADMIN — Medication 25 MILLIGRAM(S): at 09:02

## 2024-03-17 RX ADMIN — POLYETHYLENE GLYCOL 3350 119 GRAM(S): 17 POWDER, FOR SOLUTION ORAL at 09:03

## 2024-03-17 RX ADMIN — Medication 100 MILLIEQUIVALENT(S): at 12:56

## 2024-03-17 RX ADMIN — ONDANSETRON 4 MILLIGRAM(S): 8 TABLET, FILM COATED ORAL at 00:35

## 2024-03-17 RX ADMIN — Medication 100 MILLIGRAM(S): at 12:10

## 2024-03-17 RX ADMIN — Medication 10 MILLIGRAM(S): at 00:35

## 2024-03-17 RX ADMIN — SODIUM CHLORIDE 125 MILLILITER(S): 9 INJECTION, SOLUTION INTRAVENOUS at 12:56

## 2024-03-17 RX ADMIN — Medication 25 MILLIGRAM(S): at 01:22

## 2024-03-17 RX ADMIN — Medication 25 MILLIGRAM(S): at 17:22

## 2024-03-17 RX ADMIN — Medication 100 MILLIEQUIVALENT(S): at 16:11

## 2024-03-17 RX ADMIN — ONDANSETRON 4 MILLIGRAM(S): 8 TABLET, FILM COATED ORAL at 07:48

## 2024-03-17 RX ADMIN — Medication 40 MILLIEQUIVALENT(S): at 02:06

## 2024-03-17 RX ADMIN — Medication 25 MILLIGRAM(S): at 01:23

## 2024-03-17 RX ADMIN — ONDANSETRON 4 MILLIGRAM(S): 8 TABLET, FILM COATED ORAL at 14:01

## 2024-03-17 RX ADMIN — HEPARIN SODIUM 5000 UNIT(S): 5000 INJECTION INTRAVENOUS; SUBCUTANEOUS at 22:26

## 2024-03-17 RX ADMIN — HEPARIN SODIUM 5000 UNIT(S): 5000 INJECTION INTRAVENOUS; SUBCUTANEOUS at 09:02

## 2024-03-17 RX ADMIN — Medication 10 MILLIGRAM(S): at 12:11

## 2024-03-17 RX ADMIN — SODIUM CHLORIDE 125 MILLILITER(S): 9 INJECTION, SOLUTION INTRAVENOUS at 05:57

## 2024-03-17 RX ADMIN — Medication 10 MILLIGRAM(S): at 17:21

## 2024-03-17 RX ADMIN — Medication 10 MILLIGRAM(S): at 05:57

## 2024-03-17 RX ADMIN — Medication 100 MILLIEQUIVALENT(S): at 18:36

## 2024-03-17 RX ADMIN — PANTOPRAZOLE SODIUM 40 MILLIGRAM(S): 20 TABLET, DELAYED RELEASE ORAL at 12:11

## 2024-03-18 LAB
ALBUMIN SERPL ELPH-MCNC: 3.8 G/DL — SIGNIFICANT CHANGE UP (ref 3.3–5)
ALBUMIN SERPL ELPH-MCNC: 4.4 G/DL — SIGNIFICANT CHANGE UP (ref 3.3–5)
ALP SERPL-CCNC: 28 U/L — LOW (ref 40–120)
ALP SERPL-CCNC: 35 U/L — LOW (ref 40–120)
ALT FLD-CCNC: 14 U/L — SIGNIFICANT CHANGE UP (ref 10–45)
ALT FLD-CCNC: 20 U/L — SIGNIFICANT CHANGE UP (ref 10–45)
ANION GAP SERPL CALC-SCNC: 12 MMOL/L — SIGNIFICANT CHANGE UP (ref 5–17)
ANION GAP SERPL CALC-SCNC: 13 MMOL/L — SIGNIFICANT CHANGE UP (ref 5–17)
AST SERPL-CCNC: 18 U/L — SIGNIFICANT CHANGE UP (ref 10–40)
AST SERPL-CCNC: 25 U/L — SIGNIFICANT CHANGE UP (ref 10–40)
B-OH-BUTYR SERPL-SCNC: 0 MMOL/L — SIGNIFICANT CHANGE UP
BASE EXCESS BLDV CALC-SCNC: 1 MMOL/L — SIGNIFICANT CHANGE UP (ref -2–3)
BILIRUB SERPL-MCNC: 0.3 MG/DL — SIGNIFICANT CHANGE UP (ref 0.2–1.2)
BILIRUB SERPL-MCNC: 0.3 MG/DL — SIGNIFICANT CHANGE UP (ref 0.2–1.2)
BUN SERPL-MCNC: <4 MG/DL — LOW (ref 7–23)
BUN SERPL-MCNC: <4 MG/DL — LOW (ref 7–23)
CALCIUM SERPL-MCNC: 9.3 MG/DL — SIGNIFICANT CHANGE UP (ref 8.4–10.5)
CALCIUM SERPL-MCNC: 9.8 MG/DL — SIGNIFICANT CHANGE UP (ref 8.4–10.5)
CHLORIDE SERPL-SCNC: 104 MMOL/L — SIGNIFICANT CHANGE UP (ref 96–108)
CHLORIDE SERPL-SCNC: 104 MMOL/L — SIGNIFICANT CHANGE UP (ref 96–108)
CO2 BLDV-SCNC: 27 MMOL/L — HIGH (ref 22–26)
CO2 SERPL-SCNC: 22 MMOL/L — SIGNIFICANT CHANGE UP (ref 22–31)
CO2 SERPL-SCNC: 25 MMOL/L — SIGNIFICANT CHANGE UP (ref 22–31)
CREAT SERPL-MCNC: 0.41 MG/DL — LOW (ref 0.5–1.3)
CREAT SERPL-MCNC: 0.44 MG/DL — LOW (ref 0.5–1.3)
EGFR: 138 ML/MIN/1.73M2 — SIGNIFICANT CHANGE UP
EGFR: 141 ML/MIN/1.73M2 — SIGNIFICANT CHANGE UP
GAS PNL BLDV: SIGNIFICANT CHANGE UP
GLUCOSE SERPL-MCNC: 77 MG/DL — SIGNIFICANT CHANGE UP (ref 70–99)
GLUCOSE SERPL-MCNC: 98 MG/DL — SIGNIFICANT CHANGE UP (ref 70–99)
HCO3 BLDV-SCNC: 26 MMOL/L — SIGNIFICANT CHANGE UP (ref 22–29)
PCO2 BLDV: 42 MMHG — SIGNIFICANT CHANGE UP (ref 39–42)
PH BLDV: 7.4 — SIGNIFICANT CHANGE UP (ref 7.32–7.43)
PO2 BLDV: 66 MMHG — HIGH (ref 25–45)
POTASSIUM SERPL-MCNC: 3.1 MMOL/L — LOW (ref 3.5–5.3)
POTASSIUM SERPL-MCNC: 3.3 MMOL/L — LOW (ref 3.5–5.3)
POTASSIUM SERPL-SCNC: 3.1 MMOL/L — LOW (ref 3.5–5.3)
POTASSIUM SERPL-SCNC: 3.3 MMOL/L — LOW (ref 3.5–5.3)
PROT SERPL-MCNC: 6 G/DL — SIGNIFICANT CHANGE UP (ref 6–8.3)
PROT SERPL-MCNC: 7.1 G/DL — SIGNIFICANT CHANGE UP (ref 6–8.3)
SAO2 % BLDV: 94.4 % — HIGH (ref 67–88)
SODIUM SERPL-SCNC: 139 MMOL/L — SIGNIFICANT CHANGE UP (ref 135–145)
SODIUM SERPL-SCNC: 141 MMOL/L — SIGNIFICANT CHANGE UP (ref 135–145)

## 2024-03-18 PROCEDURE — 93010 ELECTROCARDIOGRAM REPORT: CPT

## 2024-03-18 PROCEDURE — 99232 SBSQ HOSP IP/OBS MODERATE 35: CPT

## 2024-03-18 RX ORDER — SENNA PLUS 8.6 MG/1
2 TABLET ORAL DAILY
Refills: 0 | Status: DISCONTINUED | OUTPATIENT
Start: 2024-03-18 | End: 2024-03-19

## 2024-03-18 RX ORDER — METOCLOPRAMIDE HCL 10 MG
5 TABLET ORAL EVERY 6 HOURS
Refills: 0 | Status: DISCONTINUED | OUTPATIENT
Start: 2024-03-18 | End: 2024-03-19

## 2024-03-18 RX ORDER — ONDANSETRON 8 MG/1
4 TABLET, FILM COATED ORAL EVERY 6 HOURS
Refills: 0 | Status: DISCONTINUED | OUTPATIENT
Start: 2024-03-18 | End: 2024-03-19

## 2024-03-18 RX ORDER — METOCLOPRAMIDE HCL 10 MG
10 TABLET ORAL EVERY 6 HOURS
Refills: 0 | Status: DISCONTINUED | OUTPATIENT
Start: 2024-03-18 | End: 2024-03-18

## 2024-03-18 RX ORDER — SODIUM CHLORIDE 9 MG/ML
1000 INJECTION, SOLUTION INTRAVENOUS
Refills: 0 | Status: DISCONTINUED | OUTPATIENT
Start: 2024-03-18 | End: 2024-03-19

## 2024-03-18 RX ORDER — PANTOPRAZOLE SODIUM 20 MG/1
40 TABLET, DELAYED RELEASE ORAL
Refills: 0 | Status: DISCONTINUED | OUTPATIENT
Start: 2024-03-18 | End: 2024-03-19

## 2024-03-18 RX ORDER — POTASSIUM CHLORIDE 20 MEQ
10 PACKET (EA) ORAL ONCE
Refills: 0 | Status: DISCONTINUED | OUTPATIENT
Start: 2024-03-18 | End: 2024-03-18

## 2024-03-18 RX ORDER — PANTOPRAZOLE SODIUM 20 MG/1
40 TABLET, DELAYED RELEASE ORAL ONCE
Refills: 0 | Status: COMPLETED | OUTPATIENT
Start: 2024-03-18 | End: 2024-03-18

## 2024-03-18 RX ORDER — POTASSIUM CHLORIDE 20 MEQ
10 PACKET (EA) ORAL
Refills: 0 | Status: DISCONTINUED | OUTPATIENT
Start: 2024-03-18 | End: 2024-03-19

## 2024-03-18 RX ORDER — DIPHENHYDRAMINE HCL 50 MG
25 CAPSULE ORAL EVERY 6 HOURS
Refills: 0 | Status: DISCONTINUED | OUTPATIENT
Start: 2024-03-18 | End: 2024-03-19

## 2024-03-18 RX ADMIN — SODIUM CHLORIDE 125 MILLILITER(S): 9 INJECTION, SOLUTION INTRAVENOUS at 21:16

## 2024-03-18 RX ADMIN — Medication 100 MILLIEQUIVALENT(S): at 09:31

## 2024-03-18 RX ADMIN — Medication 25 MILLIGRAM(S): at 02:11

## 2024-03-18 RX ADMIN — ONDANSETRON 4 MILLIGRAM(S): 8 TABLET, FILM COATED ORAL at 08:38

## 2024-03-18 RX ADMIN — Medication 12.5 MILLIGRAM(S): at 15:25

## 2024-03-18 RX ADMIN — HEPARIN SODIUM 5000 UNIT(S): 5000 INJECTION INTRAVENOUS; SUBCUTANEOUS at 13:43

## 2024-03-18 RX ADMIN — Medication 25 MILLIGRAM(S): at 18:58

## 2024-03-18 RX ADMIN — Medication 100 MILLIEQUIVALENT(S): at 18:58

## 2024-03-18 RX ADMIN — ONDANSETRON 4 MILLIGRAM(S): 8 TABLET, FILM COATED ORAL at 18:58

## 2024-03-18 RX ADMIN — Medication 10 MILLIGRAM(S): at 05:54

## 2024-03-18 RX ADMIN — Medication 12.5 MILLIGRAM(S): at 23:12

## 2024-03-18 RX ADMIN — SODIUM CHLORIDE 125 MILLILITER(S): 9 INJECTION, SOLUTION INTRAVENOUS at 13:44

## 2024-03-18 RX ADMIN — ONDANSETRON 4 MILLIGRAM(S): 8 TABLET, FILM COATED ORAL at 02:11

## 2024-03-18 RX ADMIN — PANTOPRAZOLE SODIUM 40 MILLIGRAM(S): 20 TABLET, DELAYED RELEASE ORAL at 12:56

## 2024-03-18 RX ADMIN — Medication 75 MICROGRAM(S): at 05:54

## 2024-03-18 RX ADMIN — SENNA PLUS 2 TABLET(S): 8.6 TABLET ORAL at 23:11

## 2024-03-18 RX ADMIN — Medication 100 MILLIEQUIVALENT(S): at 15:26

## 2024-03-18 RX ADMIN — ONDANSETRON 4 MILLIGRAM(S): 8 TABLET, FILM COATED ORAL at 13:44

## 2024-03-18 RX ADMIN — Medication 100 MILLIEQUIVALENT(S): at 23:11

## 2024-03-18 RX ADMIN — Medication 25 MILLIGRAM(S): at 13:44

## 2024-03-18 RX ADMIN — Medication 100 MILLIEQUIVALENT(S): at 11:05

## 2024-03-18 NOTE — CONSULT NOTE ADULT - SUBJECTIVE AND OBJECTIVE BOX
HPI:  GABRIELE REECE  23y  Female [id number]    HPI:  23y  @9w2d (OK: Oct 19 2024) admitted with hyperemesis gravidarum. Pt has been nauseous for 3-4 weeks. Became acutely worse over the last week. Unable to do any daily activities, unable to get out of bed. Felt weak, dehydrated. Was taking Diclegis and Zofran at home with no resolution. Was not able to tolerate pills or any PO intake. Pre-pregnancy weight 109lb, now 102lb when she most recently checked.     In the hospital, pt has received IV fluid hydration, electrolyte repletion, and was started on standing IV anti emetic regimen. Pt now able to tolerate some food. Hasn't had episodes of emesis since admission. Still somewhat nauseous.       Name of GYN Physician: Dr. Good  OBHx:  FT , had nausea/vomiting of pregnancy but was not as severe   GynHx: Denies fibroids, cysts, endometriosis, STI's, Abnormal pap smears   PMHx: Hypothyroid, Anemia requiring iron transfusions, syringomyelia, fatty liver  Medications: PNV, Synthroid 75mcg daily, Diclegis, Zofran  Allergies: NKDA, citrus/kiwi/pineapple->angioedema  PSHx: [facility] Meds:   MEDICATIONS  (STANDING):  dextrose 5% + lactated ringers 1000 milliLiter(s) (125 mL/Hr) IV Continuous <Continuous>  diphenhydrAMINE 25 milliGRAM(s) Oral every 6 hours  folic acid Injectable 1 milliGRAM(s) IV Push daily  heparin   Injectable 5000 Unit(s) SubCutaneous every 12 hours  levothyroxine 75 MICROGram(s) Oral daily  ondansetron    Tablet 4 milliGRAM(s) Oral every 6 hours  pantoprazole    Tablet 40 milliGRAM(s) Oral before breakfast  potassium chloride  10 mEq/100 mL IVPB 10 milliEquivalent(s) IV Intermittent every 1 hour  promethazine Suppository 12.5 milliGRAM(s) Rectal every 8 hours  pyridoxine 25 milliGRAM(s) Oral <User Schedule>  senna 2 Tablet(s) Oral daily  thiamine Injectable 100 milliGRAM(s) IV Push daily    MEDICATIONS  (PRN):  metoclopramide 5 milliGRAM(s) Oral every 6 hours PRN nausea/vomiting      Vital Signs Last 24 Hrs  T(C): 36.7 (18 Mar 2024 08:48), Max: 37.3 (18 Mar 2024 05:42)  T(F): 98.1 (18 Mar 2024 08:48), Max: 99.2 (18 Mar 2024 05:42)  HR: 80 (18 Mar 2024 08:48) (75 - 87)  BP: 102/72 (18 Mar 2024 08:48) (94/69 - 108/71)  BP(mean): --  RR: 18 (18 Mar 2024 08:48) (18 - 18)  SpO2: 96% (18 Mar 2024 08:48) (96% - 99%)    Parameters below as of 18 Mar 2024 08:48  Patient On (Oxygen Delivery Method): room air        Physical Exam:   General: sitting comfortably in bed, NAD   CV: RR S1S2 no m/r/g  Abd: Soft, non-tender, non-distended.   Ext: non-tender b/l, no edema     LABS:                              9.8    6.25  )-----------( 170      ( 17 Mar 2024 07:23 )             27.5     03-18    141  |  104  |  <4<L>  ----------------------------<  98  3.1<L>   |  25  |  0.44<L>    Ca    9.3      18 Mar 2024 07:33    TPro  6.0  /  Alb  3.8  /  TBili  0.3  /  DBili  x   /  AST  18  /  ALT  14  /  AlkPhos  28<L>  18    I&O's Detail    17 Mar 2024 07:01  -  18 Mar 2024 07:00  --------------------------------------------------------  IN:    dextrose 5% + lactated ringers: 1000 mL    IV PiggyBack: 100 mL  Total IN: 1100 mL    OUT:  Total OUT: 0 mL    Total NET: 1100 mL          Urinalysis Basic - ( 18 Mar 2024 07:33 )    Color: x / Appearance: x / SG: x / pH: x  Gluc: 98 mg/dL / Ketone: x  / Bili: x / Urobili: x   Blood: x / Protein: x / Nitrite: x   Leuk Esterase: x / RBC: x / WBC x   Sq Epi: x / Non Sq Epi: x / Bacteria: x

## 2024-03-18 NOTE — PROVIDER CONTACT NOTE (CHANGE IN STATUS NOTIFICATION) - ASSESSMENT
143/95, heart rate 135, 99% O2 sat.  repeat 132/82 heart rate 133, heart rate 116, Heart rate 97, and at 1200 127/79 heart rate. Pt stated less chest heavyness, heat rate and blood pressures decreased.

## 2024-03-18 NOTE — PROVIDER CONTACT NOTE (CHANGE IN STATUS NOTIFICATION) - RECOMMENDATIONS
Pt had EKG which was evaluated by [facility]. IV KCL was shut off and will be restarted when pt feels better. As per Alyssa Abraham, pt was instruted to ambulate with her mother.

## 2024-03-18 NOTE — CONSULT NOTE ADULT - TIME BILLING
Thank you for requesting a MFM consultation on this patient for the entities listed above. The total time spent in preparation for this visit, medical history taking, orders, review of records, counseling the patient and the family member and writing the note was 45 minutes.

## 2024-03-18 NOTE — PROVIDER CONTACT NOTE (CHANGE IN STATUS NOTIFICATION) - ACTION/TREATMENT ORDERED:
1. resart IV KCL when pt is feeling better. draw labs at 3 pm, continue to observe pt. Alyssa Abraham was at the bedside from 1142 until 1200
10.25

## 2024-03-18 NOTE — CHART NOTE - NSCHARTNOTEFT_GEN_A_CORE
PA Note  Called to assess patient by nurse. Shortly after being lightly splashed by IV potassium, pt complained of chest tightness and shortness of breath. Patient has not had any new foods or medications. Has a mild occipital headache that patient does not want medications for. Denies any other symptoms. No recent nausea or vomiting.  /82   O2 sat 100%  (HR did rise to 140bpm on initial presentation)  T 98.3  Repeat /79  HR 85  O2 sat 99%  Heart tachycardic regular rhythm  Lungs tachypnic clear bilaterally  Abd soft NT  Ext NT no edema  AP 22yo @9w2d with complaint of chest tightness and SOB which is resolving, likely panic attack. EKG done normal sinus rhythm  IV Protonix oredered  WIll continue to closely monitor.  LILLIE Guzman and Florentino Abraham PA-C

## 2024-03-18 NOTE — CONSULT NOTE ADULT - ASSESSMENT
23y  @9w2d (OK: Oct 19 2024) admitted with hyperemesis gravidarum. Patient now with improved symptoms after IV hydration and IV anti-emetic regimen.     - Patient improved with IV regimen of diphenhydramine, reglan, zofran, protonix, oral B6, promethazine suppository PRN  - Would transition to PO regimen as follows: Diphenhydramine 25mg PO q6h, Zofran 4mg PO q6h, Protonix 40mg PO qd, Promethazine 12.5 rectal q8h, Reglan 5mg PO q6h PRN  - If patient unable to tolerate regimen, can adjust as needed. Could consider steroids as next steps if nausea/vomiting not controlled with the above regimen.   - Patient inquiring about home IV nursing for administration of IV anti-emetics. If nausea/vomiting despite aggressive PO regimen, could consider IV regimen. But would attempt trial of PO prior to pursuing IV options.   - Agree with potassium repletion   - Would recommend addition of 10mEQ KCl to maintenance IV fluid   - For constipation, would continue with Senna 2 tablets at bedtime. If patient tolerates Miralax, can give to her     Praveena Guzman, PGY3   Pt seen and examined with Dr. Jenniffer FARIASM fellow and Dr. Keri FARIASM attending

## 2024-03-18 NOTE — CONSULT NOTE ADULT - ATTENDING COMMENTS
mfm attending note  pt seen and counseled on morning rounds with r3 Dr. Guzman, TONEY Abraham, and mfm fellow Dr. Abad.    Agree with excellent assessment above. Improved symptoms on current IV regimen; goal now to transition to a po and/or per rectum regimen that she can continue at home, as outlined above. Can consider steroid course and as a last resort home IV fluids / meds if patient cannot tolerate other routes of administration.    Management discussed with OB Dr. Good.  Ashlee Saavedra

## 2024-03-18 NOTE — CONSULT NOTE ADULT - NSCONSULTADDITIONALINFOA_GEN_ALL_CORE
MFM Fellow Addendum:    Patient is a 25 YO  at 9w2d with history of hypothyroidism admitted to the hospital with hyperemesis gravidarum. Prior to hospitalization, patient reports feeling very weak, multiple episodes of emesis each day including each time she ate, and 7 lb weight loss. Since admission and initiation of standing antiemetic regimen, patient denies any episodes of emesis and has started to tolerate small amounts of PO (yesterday drank small amounts of water and ate an apple). She also reports significant constipation. She did not have a bowel movement for 1 month, but finally had a bowel movement yesterday. She is afebrile, non-tachycardic and normotensive. She has no leukocytosis, has hypokalemia (K 3.1 today), normal creatinine, normal AST/ALT/Alk Phos, normal amylase/lipase, and on 3/15 had elevated beta-hydroxybutyrate with resultant anion gap acidosis (VBG pH 7.24). Unremarkable ultrasound of the appendix and the right upper quadrant (3/13/24). Overall, patient is clinically improving from the perspective of her hyperemesis.    Given patient is tolerating small amounts of PO, we discussed transitioning from IV anti-emetic regimen to oral/rectal anti-emetic regimen. Patient is open to trying this. Patient prefers small pills to ODT. Will transition to vitamin B6 25 mg PO q6h, benadryl 25 mg PO q6h, zofran 4 mg PO q6h, phenergan suppository 12.5 mg q8h, and reglan 5 mg PO q6h prn. Will additionally continue anti-acid and bowel regimen. If patient's vomiting worsens and she is unable to tolerate oral/rectal regimen, then would consider trial of steroids. Patient needs potassium supplementation. Will administer 10 mEqx6 runs and additionally add potassium to maintenance IVF. Will repeat CMP to monitor potassium and VBG/BHB to ensure resolution of starvation ketosis and resultant acidosis.  We additionally discussed BRAT diet and need for small meals. Finally, patient hypothyroid with TSH of 3.21, but reports she vomited up levothyroxine many times prior to admission. Levothyroxine dose to remain 75 mcg daily.     Loli Abad MD  [facility] Fellow  Attg: Dr. Saavedra

## 2024-03-19 ENCOUNTER — TRANSCRIPTION ENCOUNTER (OUTPATIENT)
Age: 24
End: 2024-03-19

## 2024-03-19 VITALS
OXYGEN SATURATION: 97 % | HEART RATE: 88 BPM | SYSTOLIC BLOOD PRESSURE: 95 MMHG | RESPIRATION RATE: 18 BRPM | DIASTOLIC BLOOD PRESSURE: 70 MMHG | TEMPERATURE: 98 F

## 2024-03-19 LAB
ALBUMIN SERPL ELPH-MCNC: 3.6 G/DL — SIGNIFICANT CHANGE UP (ref 3.3–5)
ALP SERPL-CCNC: 29 U/L — LOW (ref 40–120)
ALT FLD-CCNC: 22 U/L — SIGNIFICANT CHANGE UP (ref 10–45)
ANION GAP SERPL CALC-SCNC: 12 MMOL/L — SIGNIFICANT CHANGE UP (ref 5–17)
AST SERPL-CCNC: 24 U/L — SIGNIFICANT CHANGE UP (ref 10–40)
BASOPHILS # BLD AUTO: 0.04 K/UL — SIGNIFICANT CHANGE UP (ref 0–0.2)
BASOPHILS NFR BLD AUTO: 0.7 % — SIGNIFICANT CHANGE UP (ref 0–2)
BILIRUB SERPL-MCNC: 0.3 MG/DL — SIGNIFICANT CHANGE UP (ref 0.2–1.2)
BLD GP AB SCN SERPL QL: NEGATIVE — SIGNIFICANT CHANGE UP
BUN SERPL-MCNC: <4 MG/DL — LOW (ref 7–23)
CALCIUM SERPL-MCNC: 9.1 MG/DL — SIGNIFICANT CHANGE UP (ref 8.4–10.5)
CHLORIDE SERPL-SCNC: 106 MMOL/L — SIGNIFICANT CHANGE UP (ref 96–108)
CO2 SERPL-SCNC: 22 MMOL/L — SIGNIFICANT CHANGE UP (ref 22–31)
CREAT SERPL-MCNC: 0.46 MG/DL — LOW (ref 0.5–1.3)
EGFR: 137 ML/MIN/1.73M2 — SIGNIFICANT CHANGE UP
EOSINOPHIL # BLD AUTO: 0.2 K/UL — SIGNIFICANT CHANGE UP (ref 0–0.5)
EOSINOPHIL NFR BLD AUTO: 3.5 % — SIGNIFICANT CHANGE UP (ref 0–6)
GLUCOSE SERPL-MCNC: 101 MG/DL — HIGH (ref 70–99)
HCT VFR BLD CALC: 27.3 % — LOW (ref 34.5–45)
HGB BLD-MCNC: 9.6 G/DL — LOW (ref 11.5–15.5)
IMM GRANULOCYTES NFR BLD AUTO: 0.4 % — SIGNIFICANT CHANGE UP (ref 0–0.9)
LYMPHOCYTES # BLD AUTO: 2.17 K/UL — SIGNIFICANT CHANGE UP (ref 1–3.3)
LYMPHOCYTES # BLD AUTO: 38.1 % — SIGNIFICANT CHANGE UP (ref 13–44)
MCHC RBC-ENTMCNC: 27.9 PG — SIGNIFICANT CHANGE UP (ref 27–34)
MCHC RBC-ENTMCNC: 35.2 GM/DL — SIGNIFICANT CHANGE UP (ref 32–36)
MCV RBC AUTO: 79.4 FL — LOW (ref 80–100)
MONOCYTES # BLD AUTO: 0.39 K/UL — SIGNIFICANT CHANGE UP (ref 0–0.9)
MONOCYTES NFR BLD AUTO: 6.9 % — SIGNIFICANT CHANGE UP (ref 2–14)
NEUTROPHILS # BLD AUTO: 2.87 K/UL — SIGNIFICANT CHANGE UP (ref 1.8–7.4)
NEUTROPHILS NFR BLD AUTO: 50.4 % — SIGNIFICANT CHANGE UP (ref 43–77)
NRBC # BLD: 0 /100 WBCS — SIGNIFICANT CHANGE UP (ref 0–0)
PLATELET # BLD AUTO: 182 K/UL — SIGNIFICANT CHANGE UP (ref 150–400)
POTASSIUM SERPL-MCNC: 3.6 MMOL/L — SIGNIFICANT CHANGE UP (ref 3.5–5.3)
POTASSIUM SERPL-SCNC: 3.6 MMOL/L — SIGNIFICANT CHANGE UP (ref 3.5–5.3)
PROT SERPL-MCNC: 5.9 G/DL — LOW (ref 6–8.3)
RBC # BLD: 3.44 M/UL — LOW (ref 3.8–5.2)
RBC # FLD: 13.4 % — SIGNIFICANT CHANGE UP (ref 10.3–14.5)
RH IG SCN BLD-IMP: POSITIVE — SIGNIFICANT CHANGE UP
SODIUM SERPL-SCNC: 140 MMOL/L — SIGNIFICANT CHANGE UP (ref 135–145)
WBC # BLD: 5.69 K/UL — SIGNIFICANT CHANGE UP (ref 3.8–10.5)
WBC # FLD AUTO: 5.69 K/UL — SIGNIFICANT CHANGE UP (ref 3.8–10.5)

## 2024-03-19 PROCEDURE — 86850 RBC ANTIBODY SCREEN: CPT

## 2024-03-19 PROCEDURE — 82435 ASSAY OF BLOOD CHLORIDE: CPT

## 2024-03-19 PROCEDURE — 86901 BLOOD TYPING SEROLOGIC RH(D): CPT

## 2024-03-19 PROCEDURE — 82010 KETONE BODYS QUAN: CPT

## 2024-03-19 PROCEDURE — 83690 ASSAY OF LIPASE: CPT

## 2024-03-19 PROCEDURE — 85027 COMPLETE CBC AUTOMATED: CPT

## 2024-03-19 PROCEDURE — 85018 HEMOGLOBIN: CPT

## 2024-03-19 PROCEDURE — 84132 ASSAY OF SERUM POTASSIUM: CPT

## 2024-03-19 PROCEDURE — 82947 ASSAY GLUCOSE BLOOD QUANT: CPT

## 2024-03-19 PROCEDURE — 81003 URINALYSIS AUTO W/O SCOPE: CPT

## 2024-03-19 PROCEDURE — 93005 ELECTROCARDIOGRAM TRACING: CPT

## 2024-03-19 PROCEDURE — 99285 EMERGENCY DEPT VISIT HI MDM: CPT

## 2024-03-19 PROCEDURE — 82150 ASSAY OF AMYLASE: CPT

## 2024-03-19 PROCEDURE — 84295 ASSAY OF SERUM SODIUM: CPT

## 2024-03-19 PROCEDURE — 99232 SBSQ HOSP IP/OBS MODERATE 35: CPT

## 2024-03-19 PROCEDURE — 84436 ASSAY OF TOTAL THYROXINE: CPT

## 2024-03-19 PROCEDURE — 96374 THER/PROPH/DIAG INJ IV PUSH: CPT

## 2024-03-19 PROCEDURE — 84443 ASSAY THYROID STIM HORMONE: CPT

## 2024-03-19 PROCEDURE — 86900 BLOOD TYPING SEROLOGIC ABO: CPT

## 2024-03-19 PROCEDURE — 36415 COLL VENOUS BLD VENIPUNCTURE: CPT

## 2024-03-19 PROCEDURE — 85025 COMPLETE CBC W/AUTO DIFF WBC: CPT

## 2024-03-19 PROCEDURE — 82803 BLOOD GASES ANY COMBINATION: CPT

## 2024-03-19 PROCEDURE — 85014 HEMATOCRIT: CPT

## 2024-03-19 PROCEDURE — 80053 COMPREHEN METABOLIC PANEL: CPT

## 2024-03-19 PROCEDURE — 96375 TX/PRO/DX INJ NEW DRUG ADDON: CPT

## 2024-03-19 PROCEDURE — 96361 HYDRATE IV INFUSION ADD-ON: CPT

## 2024-03-19 PROCEDURE — 83605 ASSAY OF LACTIC ACID: CPT

## 2024-03-19 PROCEDURE — 82330 ASSAY OF CALCIUM: CPT

## 2024-03-19 RX ORDER — PYRIDOXINE HCL (VITAMIN B6) 100 MG
1 TABLET ORAL
Qty: 30 | Refills: 0
Start: 2024-03-19 | End: 2024-04-17

## 2024-03-19 RX ORDER — SENNA PLUS 8.6 MG/1
2 TABLET ORAL
Qty: 60 | Refills: 0
Start: 2024-03-19 | End: 2024-04-17

## 2024-03-19 RX ORDER — ONDANSETRON 8 MG/1
1 TABLET, FILM COATED ORAL
Qty: 120 | Refills: 0
Start: 2024-03-19 | End: 2024-04-17

## 2024-03-19 RX ORDER — POTASSIUM CHLORIDE 20 MEQ
10 PACKET (EA) ORAL ONCE
Refills: 0 | Status: COMPLETED | OUTPATIENT
Start: 2024-03-19 | End: 2024-03-19

## 2024-03-19 RX ORDER — DIPHENHYDRAMINE HCL 50 MG
1 CAPSULE ORAL
Qty: 120 | Refills: 0
Start: 2024-03-19 | End: 2024-04-17

## 2024-03-19 RX ORDER — LEVOTHYROXINE SODIUM 125 MCG
1 TABLET ORAL
Qty: 0 | Refills: 0 | DISCHARGE
Start: 2024-03-19

## 2024-03-19 RX ORDER — FOLIC ACID 0.8 MG
1 TABLET ORAL DAILY
Refills: 0 | Status: DISCONTINUED | OUTPATIENT
Start: 2024-03-19 | End: 2024-03-19

## 2024-03-19 RX ORDER — PANTOPRAZOLE SODIUM 20 MG/1
1 TABLET, DELAYED RELEASE ORAL
Qty: 30 | Refills: 0
Start: 2024-03-19 | End: 2024-04-17

## 2024-03-19 RX ORDER — FOLIC ACID 0.8 MG
1 TABLET ORAL
Qty: 30 | Refills: 0
Start: 2024-03-19 | End: 2024-04-17

## 2024-03-19 RX ORDER — METOCLOPRAMIDE HCL 10 MG
1 TABLET ORAL
Qty: 120 | Refills: 0
Start: 2024-03-19 | End: 2024-04-17

## 2024-03-19 RX ADMIN — Medication 75 MICROGRAM(S): at 06:15

## 2024-03-19 RX ADMIN — Medication 25 MILLIGRAM(S): at 00:22

## 2024-03-19 RX ADMIN — Medication 1 MILLIGRAM(S): at 12:06

## 2024-03-19 RX ADMIN — Medication 25 MILLIGRAM(S): at 06:15

## 2024-03-19 RX ADMIN — ONDANSETRON 4 MILLIGRAM(S): 8 TABLET, FILM COATED ORAL at 00:23

## 2024-03-19 RX ADMIN — PANTOPRAZOLE SODIUM 40 MILLIGRAM(S): 20 TABLET, DELAYED RELEASE ORAL at 08:31

## 2024-03-19 RX ADMIN — Medication 12.5 MILLIGRAM(S): at 16:22

## 2024-03-19 RX ADMIN — Medication 100 MILLIEQUIVALENT(S): at 08:57

## 2024-03-19 RX ADMIN — ONDANSETRON 4 MILLIGRAM(S): 8 TABLET, FILM COATED ORAL at 06:15

## 2024-03-19 RX ADMIN — HEPARIN SODIUM 5000 UNIT(S): 5000 INJECTION INTRAVENOUS; SUBCUTANEOUS at 10:47

## 2024-03-19 RX ADMIN — Medication 12.5 MILLIGRAM(S): at 08:32

## 2024-03-19 RX ADMIN — ONDANSETRON 4 MILLIGRAM(S): 8 TABLET, FILM COATED ORAL at 12:07

## 2024-03-19 RX ADMIN — SODIUM CHLORIDE 125 MILLILITER(S): 9 INJECTION, SOLUTION INTRAVENOUS at 06:34

## 2024-03-19 RX ADMIN — HEPARIN SODIUM 5000 UNIT(S): 5000 INJECTION INTRAVENOUS; SUBCUTANEOUS at 00:25

## 2024-03-19 RX ADMIN — Medication 25 MILLIGRAM(S): at 12:07

## 2024-03-19 NOTE — PROGRESS NOTE ADULT - ATTENDING COMMENTS
mfm attending note  pt seen and counseled on morning rounds with r3 Dr. Guzman, TONEY Abraham, and mfm fellow Dr. Abad.    Agree with excellent assessment above. Symptoms have significantly improved on current regimen and patient is tolerating po. Stable for discharge to home on current regimen as detailed above.    Management discussed with OB Dr. Good.  Ashlee Saavedra

## 2024-03-19 NOTE — DISCHARGE NOTE ANTEPARTUM - CARE PROVIDER_API CALL
Demetra Good  Obstetrics and Gynecology  [address], [address]  [place] [address]  Phone: [phone]  Fax: [phone]  Follow Up Time:

## 2024-03-19 NOTE — PROGRESS NOTE ADULT - TIME BILLING
Thank you for requesting a MFM consultation on this patient for the entities listed above. The total time spent in preparation for this visit, medical history taking, orders, review of records, counseling the patient and the family member and writing the note was 30 minutes.

## 2024-03-19 NOTE — PROGRESS NOTE ADULT - NSPROGADDITIONALINFOA_GEN_ALL_CORE
MFM Fellow Addendum:    Patient is a 23 YO  at 9w3d with history of hypothyroidism admitted to the hospital with hyperemesis gravidarum. Patient reports she is overall feeling well today. She is tolerating PO (sips of liquids, solid food) with minimal nausea and no vomiting. She is afebrile, non-tachycardic and normotensive. Labs today notable for resolution of hypokalemia (K 3.6). The hyperemesis gravidarum is currently well controlled on a combination of oral and rectal medications. Patient is safe for discharge home.    -Discharge regimen: vitamin B6 25 mg PO q6h, benadryl 25 mg PO q6h, zofran 4 mg PO q6h, phenergan suppository 12.5 mg q8h, reglan 5 mg PO q6h prn, pantoprazole 40 mg PO daily, senna 2 tablets nightly.  -For hyperemesis, we reviewed the importance of standing antiemetic regimen and dietary adjustments (BRAT diet, small portions multiple times per day)  -For constipation, we reviewed standing bowel regimen. Encouraged senna 2 tablets nightly given tolerance of administration. Reviewed other options such as metamucil, miralax, and dulcolax. Encouraged fiber intake as tolerated.  -For hypothyroidism, recommend rechecking TSH in 4 weeks (with patient tolerating her current dose of 75 mcg daily).  -Return precautions reviewed in detail with patient and her mother    Loli Abad MD  M Fellow  Attg: Dr. Saavedra MFM Fellow Addendum:    Patient is a 23 YO  at 9w3d with history of hypothyroidism admitted to the hospital with hyperemesis gravidarum. Patient reports she is overall feeling well today. She is tolerating PO (sips of liquids, solid food) with minimal nausea and no vomiting. She is afebrile, non-tachycardic and normotensive. Labs today notable for resolution of hypokalemia (K 3.6). The hyperemesis gravidarum is currently well controlled on a combination of oral and rectal medications. Patient is safe for discharge home.    -Discharge regimen: benadryl 25 mg PO q6h, zofran 4 mg PO q6h, phenergan suppository 12.5 mg q8h, pantoprazole 40 mg PO daily, senna 2 tablets nightly.  -For hyperemesis, we reviewed the importance of standing antiemetic regimen and dietary adjustments (BRAT diet, small portions multiple times per day)  -For constipation, we reviewed standing bowel regimen. Encouraged senna 2 tablets nightly given tolerance of administration. Reviewed other options such as metamucil, miralax, and dulcolax. Encouraged fiber intake as tolerated.  -For hypothyroidism, recommend rechecking TSH in 4 weeks (with patient tolerating her current dose of 75 mcg daily).  -Return precautions reviewed in detail with patient and her mother    Loli Abad MD  [facility] Fellow  Attg: Dr. Saavedra

## 2024-03-19 NOTE — DISCHARGE NOTE ANTEPARTUM - CARE PLAN
Principal Discharge DX:	Hyperemesis gravidarum  Assessment and plan of treatment:	Follow medication regimen closely    Resume normal prenatal care. Please call your doctor with any questions or concerns. Please report back to the hospital if you experience fevers, chills, nausea, vomiting, painful contractions, vaginal bleeding, loss of fluid or if you experience decreased fetal movement.   1

## 2024-03-19 NOTE — PROGRESS NOTE ADULT - ASSESSMENT
23y  9w1d (OK 10/19/2024) admitted to antepartum service with hyperemesis gravidarum. No vomiting overnight however with continued nausea.    Neuro: no pain noted, pt with some dizziness; improvement with IV fluid hydration  CV: Hemodynamically stable  Pulm: Saturating well on room air. Encourage ambulation.   GI: Continue regular diet. Start bowel regimen. Consider enema or suppository if patient unable to tolerate PO.  - BHB 3.6 on admission  - Zofran 4 mg IV q6hrs prn, Reglan 10 mg IV q6hrs, Phenergan Suppository 25 mg BID; written for home Diclegis  - amylase and lipase wnl   - f/u am thyroid panel to assess for other causes of nausea/vomiting in AM  :  Voding spontaneously.   - + Ketones on UA w/ repeat lower ketones  Heme: C/w HSQ, ambulation.  FEN:   - IV fluid repletion with vitamin additives: Thiamine 100mg IV push daily, 0.2 mg folic acid IV push daily.  - f/u AM BMP, replete electrolytes PRN  ID: Afebrile  Endo: No active issues  Dispo: Continue inpatient management of hyperemesis gravidarum    Mey Howard  PGY3
23y  9w2d (OK 10/19/2024) admitted to antepartum service with hyperemesis gravidarum. Patient still unable to tolerate PO.     #Hyperemesis Gravidarum   - c/w IV fluid repletion with vitamin additives: Thiamine 100mg IV push daily, 0.2 mg folic acid IV push daily.  - BHB 3.6 on admission  - Zofran 4 mg IV q6hrs prn, Reglan 10 mg IV q6hrs, Phenergan Suppository 25 mg BID; written for home Diclegis  - amylase and lipase wnl   - K repletion PRN   - + Ketones on UA w/ repeat lower ketones    #Fetal wellbeing  - FH check upon discharge     #Maternal wellbeing  - Regular diet  - c/w bowel regimen, consider enema or suppository if pt still unable to tolerate PO.   - HSQ/SCDs for DVT ppx  - PNV    Praveena Guzman, PGY3  
23y  OK Oct 19 2024 9w admitted to antepartum with hyperemesis gravidarum.     Neuro: no pain noted, pt with some dizziness; improvement with IV fluid hydration  CV: Hemodynamically stable  Pulm: Saturating well on room air. Encourage ambulation.   GI: Continue regular diet.   - BHB 3.6 on admission  - Zofran 4 mg IV q6hrs prn, Reglan 10 mg IV q6hrs prn, Phenergan Suppository 25 mg BID   - amylase and lipase wnl   - f/u am thyroid panel to assess for other causes of nausea/vomiting in AM  :  Voding spontaneously.   - + Ketones on UA, repeat this AM.   Heme: early ambulation   FEN:   - IV fluid repletion with vitamin additives: Thiamine 100mg IV push daily, 0.2 mg folic acid IV push daily.  ID: Afebrile  Endo: No active issues  Dispo: Continue inpatient management of hyperemesis gravidarum    Arlette Jorgensen, PGY-3  
23y  9w3d (OK 10/19/2024) admitted to antepartum service with hyperemesis gravidarum. Patient still unable to tolerate PO.     #Hyperemesis Gravidarum   - c/w IV fluid repletion with vitamin additives: Thiamine 100mg IV push daily, 0.2 mg folic acid IV push daily, 10mEq of KCl in IV fluids  - BHB 3.6->0  - Diphenhydramine 25mg PO q6h, Zofran 4mg PO q6h, Protonix 40mg PO qd, Promethazine 12.5 rectal q8h, Reglan 5mg PO q6h PRN  - amylase and lipase wnl   - K repletion PRN   - + Ketones on UA w/ repeat lower ketones    #Fetal wellbeing  - FH check upon discharge     #Maternal wellbeing  - Regular diet  - c/w bowel regimen, consider enema or suppository if pt still unable to tolerate PO.   - HSQ/SCDs for DVT ppx  - PNV    Praveena Guzman, PGY3

## 2024-03-19 NOTE — DISCHARGE NOTE ANTEPARTUM - HOSPITAL COURSE
23y  9w3d (OK 10/19/2024) admitted to antepartum service with hyperemesis gravidarum. Patient able to tolerate PO on oral and rectal medication regimen. Potassium has been repleted.

## 2024-03-19 NOTE — DISCHARGE NOTE ANTEPARTUM - PLAN OF CARE
Follow medication regimen closely    Resume normal prenatal care. Please call your doctor with any questions or concerns. Please report back to the hospital if you experience fevers, chills, nausea, vomiting, painful contractions, vaginal bleeding, loss of fluid or if you experience decreased fetal movement.

## 2024-03-19 NOTE — PROGRESS NOTE ADULT - SUBJECTIVE AND OBJECTIVE BOX
Patient seen and examined at bedside. No acute complaints. Nausea improving. Patient is ambulating and tolerating small amounts of food. Denies CP, SOB, N/V, fevers, chills, or any other concerns.    Vital Signs Last 24 Hours  T(C): 36.7 (03-19-24 @ 05:51), Max: 36.8 (03-18-24 @ 11:43)  HR: 82 (03-19-24 @ 05:51) (80 - 135)  BP: 114/78 (03-19-24 @ 05:51) (94/69 - 143/95)  RR: 18 (03-19-24 @ 05:51) (17 - 18)  SpO2: 97% (03-19-24 @ 05:51) (96% - 100%)    I&O's Summary    17 Mar 2024 07:01  -  18 Mar 2024 07:00  --------------------------------------------------------  IN: 1100 mL / OUT: 0 mL / NET: 1100 mL        Physical Exam:  General: NAD  CV: RR  Lungs: breathing comfortably on RA  Abdomen: soft, gravid, non-tender  Ext: no pain or swelling    Labs:             9.8<L>  6.25  )-----------( 170      ( 03-17 @ 07:23 )             27.5<L>               10.8<L>  7.75  )-----------( 235      ( 03-16 @ 06:59 )             31.9<L>               12.5   9.72  )-----------( 241      ( 03-15 @ 17:29 )             36.7         MEDICATIONS  (STANDING):  dextrose 5% + lactated ringers 1000 milliLiter(s) (125 mL/Hr) IV Continuous <Continuous>  diphenhydrAMINE 25 milliGRAM(s) Oral every 6 hours  folic acid Injectable 1 milliGRAM(s) IV Push daily  heparin   Injectable 5000 Unit(s) SubCutaneous every 12 hours  levothyroxine 75 MICROGram(s) Oral daily  ondansetron    Tablet 4 milliGRAM(s) Oral every 6 hours  pantoprazole    Tablet 40 milliGRAM(s) Oral before breakfast  potassium chloride  10 mEq/100 mL IVPB 10 milliEquivalent(s) IV Intermittent every 1 hour  potassium chloride  10 mEq/100 mL IVPB 10 milliEquivalent(s) IV Intermittent once  promethazine Suppository 12.5 milliGRAM(s) Rectal every 8 hours  pyridoxine 25 milliGRAM(s) Oral <User Schedule>  senna 2 Tablet(s) Oral daily    MEDICATIONS  (PRN):  metoclopramide 5 milliGRAM(s) Oral every 6 hours PRN nausea/vomiting  
R3 Antepartum Note    Patient seen and examined at bedside, no acute overnight events. No acute complaints. Pt reports +FM, denies LOF, VB, ctx, HA, epigastric pain, blurred vision, CP, SOB, fevers, and chills. Had nausea overnight and was not able to fully tolerate potassium supplementation pills but denies vomiting overnight. Concerned about ability to take Miralax secondary to continued nausea.    Vital Signs Last 24 Hours  T(C): 36.8 (03-16-24 @ 20:25), Max: 37 (03-16-24 @ 09:00)  HR: 78 (03-16-24 @ 20:25) (69 - 84)  BP: 104/68 (03-16-24 @ 20:25) (90/59 - 104/68)  RR: 18 (03-16-24 @ 20:25) (18 - 18)  SpO2: 98% (03-16-24 @ 20:25) (97% - 100%)    CAPILLARY BLOOD GLUCOSE    Physical Exam:  General: NAD  Abdomen: Soft, non-tender  Ext: No pain or swelling    Labs:             10.8   7.75  )-----------( 235      ( 03-16 @ 06:59 )             31.9     03-16 @ 06:55    137  |  108  |  6   ----------------------------<  65  3.3   |  15  |  0.46    Ca    9.1      03-16 @ 06:55    TPro  6.2  /  Alb  3.9  /  TBili  0.5  /  DBili  x   /  AST  21  /  ALT  14  /  AlkPhos  33  03-16 @ 06:55    MEDICATIONS  (STANDING):  dextrose 5% + lactated ringers. 1000 milliLiter(s) (125 mL/Hr) IV Continuous <Continuous>  diphenhydrAMINE 25 milliGRAM(s) Oral every 8 hours  folic acid Injectable 1 milliGRAM(s) IV Push daily  heparin   Injectable 5000 Unit(s) SubCutaneous every 12 hours  levothyroxine 75 MICROGram(s) Oral daily  metoclopramide Injectable 10 milliGRAM(s) IV Push every 6 hours  ondansetron Injectable 4 milliGRAM(s) IV Push every 6 hours  pantoprazole  Injectable 40 milliGRAM(s) IV Push daily  polyethylene glycol 3350 119 Gram(s) Oral once  potassium chloride    Tablet ER 40 milliEquivalent(s) Oral every 4 hours  pyridoxine 25 milliGRAM(s) Oral <User Schedule>  senna 1 Tablet(s) Oral daily  thiamine Injectable 100 milliGRAM(s) IV Push daily    MEDICATIONS  (PRN):  promethazine Suppository 25 milliGRAM(s) Rectal every 8 hours PRN Nausea/Vomiting  
  Patient seen and examined at bedside. Pt reports no emesis since admission. Several bites of food with associated nausea. Feels less weak/ fatigued.  Ambulating and voiding without difficulty     Vital Signs Last 24 Hours  T(C): 36.8 (03-15-24 @ 23:32), Max: 36.8 (03-15-24 @ 23:32)  HR: 79 (03-15-24 @ 23:32) (76 - 93)  BP: 98/63 (03-15-24 @ 23:32) (98/63 - 106/74)  RR: 18 (03-15-24 @ 23:32) (16 - 18)  SpO2: 96% (03-15-24 @ 23:32) (96% - 100%)    CAPILLARY BLOOD GLUCOSE      Physical Exam:  General: NAD  Abdomen: Soft, non-tender, gravid  Ext: No pain or swelling    + FH on admission    Labs:             12.5   9.72  )-----------( 241      ( 03-15 @ 17:29 )             36.7     03-15 @ 17:29    137  |  103  |  10  ----------------------------<  60  3.8   |  13  |  0.50    Ca    9.5      03-15 @ 17:29    TPro  7.4  /  Alb  4.5  /  TBili  0.4  /  DBili  x   /  AST  26  /  ALT  18  /  AlkPhos  38  03-15 @ 17:29        MEDICATIONS  (STANDING):  folic acid Injectable 1 milliGRAM(s) IV Push daily  heparin   Injectable 5000 Unit(s) SubCutaneous every 12 hours  lactated ringers 1000 milliLiter(s) (125 mL/Hr) IV Continuous <Continuous>  levothyroxine 75 MICROGram(s) Oral daily  ondansetron Injectable 4 milliGRAM(s) IV Push every 6 hours  pantoprazole  Injectable 40 milliGRAM(s) IV Push daily  pyridoxine 25 milliGRAM(s) Oral every 12 hours  senna 1 Tablet(s) Oral daily  thiamine Injectable 100 milliGRAM(s) IV Push daily    MEDICATIONS  (PRN):  metoclopramide Injectable 10 milliGRAM(s) IV Push every 6 hours PRN Nausea and/or Vomiting related to pregnancy  promethazine Suppository 25 milliGRAM(s) Rectal every 8 hours PRN Nausea/Vomiting  
Patient seen and examined at bedside, no acute overnight events. No acute complaints. Patient had bowel movement. Patient is ambulating. Not yet tolerating regular diet. Denies CP, SOB, N/V, fevers, chills, or any other concerns.    Vital Signs Last 24 Hours  T(C): 36.7 (03-17-24 @ 20:37), Max: 36.7 (03-17-24 @ 16:51)  HR: 87 (03-17-24 @ 20:37) (75 - 87)  BP: 108/71 (03-17-24 @ 20:37) (94/69 - 108/71)  RR: 18 (03-17-24 @ 20:37) (18 - 18)  SpO2: 99% (03-17-24 @ 20:37) (98% - 99%)    I&O's Summary    16 Mar 2024 07:01  -  17 Mar 2024 07:00  --------------------------------------------------------  IN: 1005 mL / OUT: 0 mL / NET: 1005 mL    17 Mar 2024 07:01  -  18 Mar 2024 06:25  --------------------------------------------------------  IN: 1100 mL / OUT: 0 mL / NET: 1100 mL        Physical Exam:  General: NAD  CV: RR  Lungs: breathing comfortably on RA  Abdomen: soft, gravid, non-tender  Ext: no pain or swelling    Labs:             9.8<L>  6.25  )-----------( 170      ( 03-17 @ 07:23 )             27.5<L>               10.8<L>  7.75  )-----------( 235      ( 03-16 @ 06:59 )             31.9<L>               12.5   9.72  )-----------( 241      ( 03-15 @ 17:29 )             36.7         MEDICATIONS  (STANDING):  dextrose 5% + lactated ringers. 1000 milliLiter(s) (125 mL/Hr) IV Continuous <Continuous>  diphenhydrAMINE Injectable 25 milliGRAM(s) IV Push every 8 hours  folic acid Injectable 1 milliGRAM(s) IV Push daily  heparin   Injectable 5000 Unit(s) SubCutaneous every 12 hours  levothyroxine 75 MICROGram(s) Oral daily  metoclopramide Injectable 10 milliGRAM(s) IV Push every 6 hours  ondansetron Injectable 4 milliGRAM(s) IV Push every 6 hours  pantoprazole  Injectable 40 milliGRAM(s) IV Push daily  pyridoxine 25 milliGRAM(s) Oral <User Schedule>  senna 1 Tablet(s) Oral daily  thiamine Injectable 100 milliGRAM(s) IV Push daily    MEDICATIONS  (PRN):  promethazine Suppository 25 milliGRAM(s) Rectal every 8 hours PRN Nausea/Vomiting

## 2024-03-19 NOTE — DISCHARGE NOTE ANTEPARTUM - PATIENT PORTAL LINK FT
You can access the FollowMyHealth Patient Portal offered by [facility] by registering at the following website: [email or web address]. By joining [facility]’s FollowMyHealth portal, you will also be able to view your health information using other applications (apps) compatible with our system.

## 2024-03-19 NOTE — DISCHARGE NOTE ANTEPARTUM - MEDICATION SUMMARY - MEDICATIONS TO TAKE
I will START or STAY ON the medications listed below when I get home from the hospital:    diphenhydrAMINE 25 mg oral capsule  -- 1 cap(s) by mouth every 6 hours  -- Indication: For Hyperemesis gravidarum    metoclopramide 5 mg oral tablet  -- 1 tab(s) by mouth every 6 hours as needed for nausea/vomiting  -- Indication: For Hyperemesis gravidarum    promethazine 12.5 mg rectal suppository  -- 1 suppository(ies) rectally every 8 hours  -- Indication: For Hyperemesis gravidarum    ondansetron 4 mg oral tablet  -- 1 tab(s) by mouth every 6 hours  -- Indication: For Hyperemesis gravidarum    senna leaf extract oral tablet  -- 2 tab(s) by mouth once a day (at bedtime)  -- Indication: For Constipation    pantoprazole 40 mg oral delayed release tablet  -- 1 tab(s) by mouth once a day (before a meal)  -- Indication: For Hyperemesis gravidarum    levothyroxine 75 mcg (0.075 mg) oral tablet  -- 1 tab(s) by mouth once a day  -- Indication: For Hypothyroidism    folic acid 1 mg oral tablet  -- 1 tab(s) by mouth once a day  -- Indication: For Pregnancy    pyridoxine 25 mg oral tablet  -- 1 tab(s) by mouth once a day  -- Indication: For Hyperemesis gravidarum

## 2024-03-19 NOTE — DISCHARGE NOTE ANTEPARTUM - MEDICATION SUMMARY - MEDICATIONS TO STOP TAKING
I will STOP taking the medications listed below when I get home from the hospital:    ibuprofen 600 mg oral tablet  -- 1 tab(s) by mouth every 6 hours    acetaminophen 325 mg oral tablet  -- 3 tab(s) by mouth    cephalexin 500 mg oral capsule  -- 1 cap(s) by mouth 4 times a day

## 2024-05-07 LAB
APPEARANCE: CLEAR
APPEARANCE: CLEAR
BACTERIA UR CULT: NORMAL
BACTERIA: ABNORMAL /HPF
BACTERIA: NEGATIVE /HPF
BASOPHILS # BLD AUTO: 0.03 K/UL
BASOPHILS NFR BLD AUTO: 0.3 %
BILIRUBIN URINE: NEGATIVE
BILIRUBIN URINE: NEGATIVE
BLOOD URINE: ABNORMAL
BLOOD URINE: NEGATIVE
CAST: 0 /LPF
CAST: 1 /LPF
CHOLEST SERPL-MCNC: 198 MG/DL
COLOR: YELLOW
COLOR: YELLOW
EOSINOPHIL # BLD AUTO: 0.13 K/UL
EOSINOPHIL NFR BLD AUTO: 1.5 %
EPITHELIAL CELLS: 10 /HPF
EPITHELIAL CELLS: 10 /HPF
ESTIMATED AVERAGE GLUCOSE: 108 MG/DL
FERRITIN SERPL-MCNC: 486 NG/ML
FOLATE SERPL-MCNC: 14.3 NG/ML
GLUCOSE QUALITATIVE U: NEGATIVE MG/DL
GLUCOSE QUALITATIVE U: NEGATIVE MG/DL
HAPTOGLOB SERPL-MCNC: 63 MG/DL
HBA1C MFR BLD HPLC: 5.4 %
HCG SERPL-MCNC: <1 MIU/ML
HCT VFR BLD CALC: 40.4 %
HDLC SERPL-MCNC: 69 MG/DL
HGB BLD-MCNC: 13.2 G/DL
IMM GRANULOCYTES NFR BLD AUTO: 0.3 %
IRON SATN MFR SERPL: 18 %
IRON SERPL-MCNC: 76 UG/DL
KETONES URINE: NEGATIVE MG/DL
KETONES URINE: NEGATIVE MG/DL
LDH SERPL-CCNC: 155 U/L
LDLC SERPL CALC-MCNC: 103 MG/DL
LEUKOCYTE ESTERASE URINE: ABNORMAL
LEUKOCYTE ESTERASE URINE: NEGATIVE
LYMPHOCYTES # BLD AUTO: 2.65 K/UL
LYMPHOCYTES NFR BLD AUTO: 30.6 %
MAN DIFF?: NORMAL
MCHC RBC-ENTMCNC: 27.7 PG
MCHC RBC-ENTMCNC: 32.7 GM/DL
MCV RBC AUTO: 84.9 FL
MICROSCOPIC-UA: NORMAL
MICROSCOPIC-UA: NORMAL
MONOCYTES # BLD AUTO: 0.47 K/UL
MONOCYTES NFR BLD AUTO: 5.4 %
NEUTROPHILS # BLD AUTO: 5.36 K/UL
NEUTROPHILS NFR BLD AUTO: 61.9 %
NITRITE URINE: NEGATIVE
NITRITE URINE: NEGATIVE
NONHDLC SERPL-MCNC: 129 MG/DL
PH URINE: 6
PH URINE: 7
PLATELET # BLD AUTO: 304 K/UL
PROTEIN URINE: NEGATIVE MG/DL
PROTEIN URINE: NEGATIVE MG/DL
RBC # BLD: 4.76 M/UL
RBC # FLD: 13.7 %
RED BLOOD CELLS URINE: 0 /HPF
RED BLOOD CELLS URINE: 0 /HPF
SPECIFIC GRAVITY URINE: 1.01
SPECIFIC GRAVITY URINE: 1.02
T3FREE SERPL-MCNC: 3.35 PG/ML
T4 FREE SERPL-MCNC: 1.3 NG/DL
TIBC SERPL-MCNC: 432 UG/DL
TRANSFERRIN SERPL-MCNC: 320 MG/DL
TRIGL SERPL-MCNC: 152 MG/DL
TSH SERPL-ACNC: 2.72 UIU/ML
UIBC SERPL-MCNC: 356 UG/DL
UROBILINOGEN URINE: 0.2 MG/DL
UROBILINOGEN URINE: 0.2 MG/DL
VIT B12 SERPL-MCNC: 586 PG/ML
WBC # FLD AUTO: 8.67 K/UL
WHITE BLOOD CELLS URINE: 1 /HPF
WHITE BLOOD CELLS URINE: 11 /HPF

## 2024-06-07 ENCOUNTER — ASOB RESULT (OUTPATIENT)
Age: 24
End: 2024-06-07

## 2024-06-07 ENCOUNTER — APPOINTMENT (OUTPATIENT)
Dept: ANTEPARTUM | Facility: CLINIC | Age: 24
End: 2024-06-07
Payer: COMMERCIAL

## 2024-06-07 PROCEDURE — 76811 OB US DETAILED SNGL FETUS: CPT

## 2024-07-31 ENCOUNTER — ASOB RESULT (OUTPATIENT)
Age: 24
End: 2024-07-31

## 2024-07-31 ENCOUNTER — APPOINTMENT (OUTPATIENT)
Dept: ANTEPARTUM | Facility: CLINIC | Age: 24
End: 2024-07-31
Payer: COMMERCIAL

## 2024-07-31 PROCEDURE — 76819 FETAL BIOPHYS PROFIL W/O NST: CPT

## 2024-07-31 PROCEDURE — 76816 OB US FOLLOW-UP PER FETUS: CPT

## 2024-07-31 PROCEDURE — 99214 OFFICE O/P EST MOD 30 MIN: CPT | Mod: 25

## 2024-08-14 ENCOUNTER — ASOB RESULT (OUTPATIENT)
Age: 24
End: 2024-08-14

## 2024-08-14 ENCOUNTER — APPOINTMENT (OUTPATIENT)
Dept: ANTEPARTUM | Facility: CLINIC | Age: 24
End: 2024-08-14

## 2024-08-14 PROCEDURE — 76819 FETAL BIOPHYS PROFIL W/O NST: CPT

## 2024-08-26 ENCOUNTER — OUTPATIENT (OUTPATIENT)
Dept: OUTPATIENT SERVICES | Facility: HOSPITAL | Age: 24
LOS: 1 days | Discharge: HOME | End: 2024-08-26

## 2024-08-26 DIAGNOSIS — D64.9 ANEMIA, UNSPECIFIED: ICD-10-CM

## 2024-08-29 ENCOUNTER — APPOINTMENT (OUTPATIENT)
Dept: ANTEPARTUM | Facility: CLINIC | Age: 24
End: 2024-08-29

## 2024-08-29 ENCOUNTER — ASOB RESULT (OUTPATIENT)
Age: 24
End: 2024-08-29

## 2024-08-29 PROCEDURE — 76819 FETAL BIOPHYS PROFIL W/O NST: CPT | Mod: 59

## 2024-08-29 PROCEDURE — 76816 OB US FOLLOW-UP PER FETUS: CPT

## 2024-09-04 ENCOUNTER — NON-APPOINTMENT (OUTPATIENT)
Age: 24
End: 2024-09-04

## 2024-09-05 ENCOUNTER — RESULT REVIEW (OUTPATIENT)
Age: 24
End: 2024-09-05

## 2024-09-05 ENCOUNTER — APPOINTMENT (OUTPATIENT)
Dept: HEMATOLOGY ONCOLOGY | Facility: CLINIC | Age: 24
End: 2024-09-05
Payer: COMMERCIAL

## 2024-09-05 VITALS
HEIGHT: 60.47 IN | WEIGHT: 136.02 LBS | OXYGEN SATURATION: 97 % | TEMPERATURE: 98.4 F | RESPIRATION RATE: 16 BRPM | HEART RATE: 91 BPM | BODY MASS INDEX: 26.02 KG/M2

## 2024-09-05 DIAGNOSIS — O99.019 ANEMIA COMPLICATING PREGNANCY, UNSPECIFIED TRIMESTER: ICD-10-CM

## 2024-09-05 DIAGNOSIS — E03.9 HYPOTHYROIDISM, UNSPECIFIED: ICD-10-CM

## 2024-09-05 LAB
BASOPHILS # BLD AUTO: 0.07 K/UL — SIGNIFICANT CHANGE UP (ref 0–0.2)
BASOPHILS NFR BLD AUTO: 0.4 % — SIGNIFICANT CHANGE UP (ref 0–2)
EOSINOPHIL # BLD AUTO: 0.19 K/UL — SIGNIFICANT CHANGE UP (ref 0–0.5)
EOSINOPHIL NFR BLD AUTO: 1.1 % — SIGNIFICANT CHANGE UP (ref 0–6)
HCT VFR BLD CALC: 33.3 % — LOW (ref 34.5–45)
HGB BLD-MCNC: 11.1 G/DL — LOW (ref 11.5–15.5)
IMM GRANULOCYTES NFR BLD AUTO: 2.9 % — HIGH (ref 0–0.9)
LYMPHOCYTES # BLD AUTO: 15.5 % — SIGNIFICANT CHANGE UP (ref 13–44)
LYMPHOCYTES # BLD AUTO: 2.64 K/UL — SIGNIFICANT CHANGE UP (ref 1–3.3)
MCHC RBC-ENTMCNC: 28.5 PG — SIGNIFICANT CHANGE UP (ref 27–34)
MCHC RBC-ENTMCNC: 33.3 G/DL — SIGNIFICANT CHANGE UP (ref 32–36)
MCV RBC AUTO: 85.6 FL — SIGNIFICANT CHANGE UP (ref 80–100)
MONOCYTES # BLD AUTO: 0.99 K/UL — HIGH (ref 0–0.9)
MONOCYTES NFR BLD AUTO: 5.8 % — SIGNIFICANT CHANGE UP (ref 2–14)
NEUTROPHILS # BLD AUTO: 12.68 K/UL — HIGH (ref 1.8–7.4)
NEUTROPHILS NFR BLD AUTO: 74.3 % — SIGNIFICANT CHANGE UP (ref 43–77)
NRBC # BLD: 0 /100 WBCS — SIGNIFICANT CHANGE UP (ref 0–0)
NRBC BLD-RTO: 0 /100 WBCS — SIGNIFICANT CHANGE UP (ref 0–0)
PLATELET # BLD AUTO: 235 K/UL — SIGNIFICANT CHANGE UP (ref 150–400)
RBC # BLD: 3.89 M/UL — SIGNIFICANT CHANGE UP (ref 3.8–5.2)
RBC # FLD: 13.9 % — SIGNIFICANT CHANGE UP (ref 10.3–14.5)
RETICS #: 70.8 K/UL — SIGNIFICANT CHANGE UP (ref 25–125)
RETICS/RBC NFR: 1.8 % — SIGNIFICANT CHANGE UP (ref 0.5–2.5)
WBC # BLD: 17.07 K/UL — HIGH (ref 3.8–10.5)
WBC # FLD AUTO: 17.07 K/UL — HIGH (ref 3.8–10.5)

## 2024-09-05 PROCEDURE — 99204 OFFICE O/P NEW MOD 45 MIN: CPT

## 2024-09-05 NOTE — HISTORY OF PRESENT ILLNESS
[0 - No Distress] : Distress Level: 0 [de-identified] : 24 yo woman with PMHx of hypothyroidism, referred for evaluation of anemia in pregnancy. (second pregnancy) Patient was also anemic with her first pregnancy and received intravenous iron.  Patient reports feeling fatigue, positive for light headiness and dizziness, intermittent sob with exertion, denies chest pain. Denies fevers, night sweats or weight loss. Denies bleeding.  7/30/24- WBC 13.8, RBC 3.68, Hb 10.6g/dl, Hct 33.3, MCV 90.5, , ANC 10.39, lymphocytes 2.32, mono 0.65, eos 0.2, basos 0.03.

## 2024-09-05 NOTE — REVIEW OF SYSTEMS
[Negative] : Allergic/Immunologic [Fatigue] : fatigue [SOB on Exertion] : shortness of breath during exertion [Dizziness] : dizziness

## 2024-09-05 NOTE — ASSESSMENT
[FreeTextEntry1] : 22 yo woman with PMHx of hypothyroidism, referred for evaluation of anemia in pregnancy. (second pregnancy) Patient was also anemic with her first pregnancy and received intravenous iron.  Patient reports feeling fatigue, positive for light headiness and dizziness, intermittent sob with exertion, denies chest pain. Denies fevers, night sweats or weight loss. Denies bleeding.  7/30/24- WBC 13.8, RBC 3.68, Hb 10.6g/dl, Hct 33.3, MCV 90.5, , ANC 10.39, lymphocytes 2.32, mono 0.65, eos 0.2, basos 0.03.   I had a detailed discussion today with the patient regarding the natural history, epidemiology, diagnosis, and treatment of JOHANNA. I reviewed her laboratory studies in detail today. I then discussed treatment for JOHANNA with Venofer 200 mg  x 5 doses. I reviewed with patient the benefits versus risks of therapy. I answered all her questions to satisfaction. Complete anemia work up will be done today.   Greater than 50% of the encounter time was spent on counseling and coordination of care for   JOHANNA   and I have spent     60 minutes of face-to-face time with the patient.  RTC 3 months.

## 2024-09-06 DIAGNOSIS — D72.829 ELEVATED WHITE BLOOD CELL COUNT, UNSPECIFIED: ICD-10-CM

## 2024-09-06 LAB
ALBUMIN SERPL ELPH-MCNC: 4 G/DL
ALP BLD-CCNC: 106 U/L
ALT SERPL-CCNC: 12 U/L
ANION GAP SERPL CALC-SCNC: 11 MMOL/L
AST SERPL-CCNC: 18 U/L
BILIRUB SERPL-MCNC: <0.2 MG/DL
BUN SERPL-MCNC: 6 MG/DL
CALCIUM SERPL-MCNC: 9.1 MG/DL
CHLORIDE SERPL-SCNC: 104 MMOL/L
CO2 SERPL-SCNC: 21 MMOL/L
CREAT SERPL-MCNC: 0.4 MG/DL
CRP SERPL-MCNC: <3 MG/L
DEPRECATED KAPPA LC FREE/LAMBDA SER: 1.27 RATIO
EGFR: 142 ML/MIN/1.73M2
ERYTHROCYTE [SEDIMENTATION RATE] IN BLOOD BY WESTERGREN METHOD: 33 MM/HR
FERRITIN SERPL-MCNC: 42 NG/ML
FOLATE SERPL-MCNC: 9.1 NG/ML
GLUCOSE SERPL-MCNC: 74 MG/DL
HAPTOGLOB SERPL-MCNC: 60 MG/DL
IRON SATN MFR SERPL: 13 %
IRON SERPL-MCNC: 59 UG/DL
KAPPA LC CSF-MCNC: 0.82 MG/DL
KAPPA LC SERPL-MCNC: 1.04 MG/DL
LDH SERPL-CCNC: 178 U/L
POTASSIUM SERPL-SCNC: 4.1 MMOL/L
PROT SERPL-MCNC: 6.2 G/DL
RBC # BLD: 4.02 M/UL
RETICS # AUTO: 2 %
RETICS AGGREG/RBC NFR: 79.2 K/UL
SODIUM SERPL-SCNC: 136 MMOL/L
TIBC SERPL-MCNC: 464 UG/DL
UIBC SERPL-MCNC: 405 UG/DL
VIT B12 SERPL-MCNC: 289 PG/ML

## 2024-09-09 LAB — M PROTEIN SPEC IFE-MCNC: NORMAL

## 2024-10-22 ENCOUNTER — APPOINTMENT (OUTPATIENT)
Dept: ANTEPARTUM | Facility: CLINIC | Age: 24
End: 2024-10-22

## 2024-10-22 ENCOUNTER — INPATIENT (INPATIENT)
Facility: HOSPITAL | Age: 24
LOS: 1 days | Discharge: HOME | DRG: 951 | End: 2024-10-24
Attending: OBSTETRICS & GYNECOLOGY | Admitting: OBSTETRICS & GYNECOLOGY
Payer: COMMERCIAL

## 2024-10-22 VITALS — OXYGEN SATURATION: 97 % | HEART RATE: 87 BPM

## 2024-10-22 DIAGNOSIS — Z34.80 ENCOUNTER FOR SUPERVISION OF OTHER NORMAL PREGNANCY, UNSPECIFIED TRIMESTER: ICD-10-CM

## 2024-10-22 DIAGNOSIS — O26.899 OTHER SPECIFIED PREGNANCY RELATED CONDITIONS, UNSPECIFIED TRIMESTER: ICD-10-CM

## 2024-10-22 LAB
BASOPHILS # BLD AUTO: 0.03 K/UL — SIGNIFICANT CHANGE UP (ref 0–0.2)
BASOPHILS NFR BLD AUTO: 0.2 % — SIGNIFICANT CHANGE UP (ref 0–2)
EOSINOPHIL # BLD AUTO: 0.13 K/UL — SIGNIFICANT CHANGE UP (ref 0–0.5)
EOSINOPHIL NFR BLD AUTO: 0.8 % — SIGNIFICANT CHANGE UP (ref 0–6)
HCT VFR BLD CALC: 35 % — SIGNIFICANT CHANGE UP (ref 34.5–45)
HGB BLD-MCNC: 11.5 G/DL — SIGNIFICANT CHANGE UP (ref 11.5–15.5)
IMM GRANULOCYTES NFR BLD AUTO: 1.1 % — HIGH (ref 0–0.9)
LYMPHOCYTES # BLD AUTO: 13.6 % — SIGNIFICANT CHANGE UP (ref 13–44)
LYMPHOCYTES # BLD AUTO: 2.25 K/UL — SIGNIFICANT CHANGE UP (ref 1–3.3)
MCHC RBC-ENTMCNC: 26.7 PG — LOW (ref 27–34)
MCHC RBC-ENTMCNC: 32.9 GM/DL — SIGNIFICANT CHANGE UP (ref 32–36)
MCV RBC AUTO: 81.4 FL — SIGNIFICANT CHANGE UP (ref 80–100)
MONOCYTES # BLD AUTO: 0.89 K/UL — SIGNIFICANT CHANGE UP (ref 0–0.9)
MONOCYTES NFR BLD AUTO: 5.4 % — SIGNIFICANT CHANGE UP (ref 2–14)
NEUTROPHILS # BLD AUTO: 13.04 K/UL — HIGH (ref 1.8–7.4)
NEUTROPHILS NFR BLD AUTO: 78.9 % — HIGH (ref 43–77)
NRBC # BLD: 0 /100 WBCS — SIGNIFICANT CHANGE UP (ref 0–0)
PLATELET # BLD AUTO: 228 K/UL — SIGNIFICANT CHANGE UP (ref 150–400)
RBC # BLD: 4.3 M/UL — SIGNIFICANT CHANGE UP (ref 3.8–5.2)
RBC # FLD: 14.8 % — HIGH (ref 10.3–14.5)
T PALLIDUM AB TITR SER: NEGATIVE — SIGNIFICANT CHANGE UP
WBC # BLD: 16.53 K/UL — HIGH (ref 3.8–10.5)
WBC # FLD AUTO: 16.53 K/UL — HIGH (ref 3.8–10.5)

## 2024-10-22 RX ORDER — KETOROLAC TROMETHAMINE 30 MG/ML
30 INJECTION INTRAMUSCULAR; INTRAVENOUS ONCE
Refills: 0 | Status: DISCONTINUED | OUTPATIENT
Start: 2024-10-22 | End: 2024-10-22

## 2024-10-22 RX ORDER — MODIFIED LANOLIN
1 OINTMENT (GRAM) TOPICAL EVERY 6 HOURS
Refills: 0 | Status: DISCONTINUED | OUTPATIENT
Start: 2024-10-22 | End: 2024-10-24

## 2024-10-22 RX ORDER — IBUPROFEN 200 MG
600 TABLET ORAL EVERY 6 HOURS
Refills: 0 | Status: DISCONTINUED | OUTPATIENT
Start: 2024-10-22 | End: 2024-10-24

## 2024-10-22 RX ORDER — OXYTOCIN IN D5W-0.2% SODIUM CL 15/250 ML
10 PLASTIC BAG, INJECTION (ML) INTRAVENOUS ONCE
Refills: 0 | Status: COMPLETED | OUTPATIENT
Start: 2024-10-22 | End: 2024-10-22

## 2024-10-22 RX ORDER — MAGNESIUM HYDROXIDE 1200 MG/15ML
30 SUSPENSION ORAL
Refills: 0 | Status: DISCONTINUED | OUTPATIENT
Start: 2024-10-22 | End: 2024-10-24

## 2024-10-22 RX ORDER — PRENATAL VIT/IRON FUM/FOLIC AC 60 MG-1 MG
1 TABLET ORAL DAILY
Refills: 0 | Status: DISCONTINUED | OUTPATIENT
Start: 2024-10-22 | End: 2024-10-24

## 2024-10-22 RX ORDER — CHLORHEXIDINE GLUCONATE 40 MG/ML
1 SOLUTION TOPICAL DAILY
Refills: 0 | Status: DISCONTINUED | OUTPATIENT
Start: 2024-10-22 | End: 2024-10-22

## 2024-10-22 RX ORDER — OXYTOCIN IN D5W-0.2% SODIUM CL 15/250 ML
167 PLASTIC BAG, INJECTION (ML) INTRAVENOUS
Qty: 30 | Refills: 0 | Status: DISCONTINUED | OUTPATIENT
Start: 2024-10-22 | End: 2024-10-24

## 2024-10-22 RX ORDER — PRAMOXINE HCL 1 %
1 CREAM (GRAM) RECTAL EVERY 4 HOURS
Refills: 0 | Status: DISCONTINUED | OUTPATIENT
Start: 2024-10-22 | End: 2024-10-24

## 2024-10-22 RX ORDER — CLOSTRIDIUM TETANI TOXOID ANTIGEN (FORMALDEHYDE INACTIVATED), CORYNEBACTERIUM DIPHTHERIAE TOXOID ANTIGEN (FORMALDEHYDE INACTIVATED), BORDETELLA PERTUSSIS TOXOID ANTIGEN (GLUTARALDEHYDE INACTIVATED), BORDETELLA PERTUSSIS FILAMENTOUS HEMAGGLUTININ ANTIGEN (FORMALDEHYDE INACTIVATED), BORDETELLA PERTUSSIS PERTACTIN ANTIGEN, AND BORDETELLA PERTUSSIS FIMBRIAE 2/3 ANTIGEN 5; 2; 2.5; 5; 3; 5 [LF]/.5ML; [LF]/.5ML; UG/.5ML; UG/.5ML; UG/.5ML; UG/.5ML
0.5 INJECTION, SUSPENSION INTRAMUSCULAR ONCE
Refills: 0 | Status: DISCONTINUED | OUTPATIENT
Start: 2024-10-22 | End: 2024-10-24

## 2024-10-22 RX ORDER — IBUPROFEN 200 MG
600 TABLET ORAL EVERY 6 HOURS
Refills: 0 | Status: COMPLETED | OUTPATIENT
Start: 2024-10-22 | End: 2025-09-20

## 2024-10-22 RX ORDER — OXYCODONE HYDROCHLORIDE 30 MG/1
5 TABLET ORAL
Refills: 0 | Status: DISCONTINUED | OUTPATIENT
Start: 2024-10-22 | End: 2024-10-24

## 2024-10-22 RX ORDER — SODIUM CHLORIDE 9 MG/ML
3 INJECTION, SOLUTION INTRAMUSCULAR; INTRAVENOUS; SUBCUTANEOUS EVERY 8 HOURS
Refills: 0 | Status: DISCONTINUED | OUTPATIENT
Start: 2024-10-22 | End: 2024-10-24

## 2024-10-22 RX ORDER — OXYCODONE HYDROCHLORIDE 30 MG/1
5 TABLET ORAL ONCE
Refills: 0 | Status: DISCONTINUED | OUTPATIENT
Start: 2024-10-22 | End: 2024-10-24

## 2024-10-22 RX ORDER — SIMETHICONE 80 MG/1
80 TABLET, CHEWABLE ORAL EVERY 4 HOURS
Refills: 0 | Status: DISCONTINUED | OUTPATIENT
Start: 2024-10-22 | End: 2024-10-24

## 2024-10-22 RX ORDER — DIBUCAINE 1 %
1 OINTMENT (GRAM) TOPICAL EVERY 6 HOURS
Refills: 0 | Status: DISCONTINUED | OUTPATIENT
Start: 2024-10-22 | End: 2024-10-24

## 2024-10-22 RX ORDER — CITRIC ACID/SODIUM CITRATE 334-500MG
15 SOLUTION, ORAL ORAL EVERY 6 HOURS
Refills: 0 | Status: DISCONTINUED | OUTPATIENT
Start: 2024-10-22 | End: 2024-10-22

## 2024-10-22 RX ORDER — ACETAMINOPHEN 500 MG
975 TABLET ORAL
Refills: 0 | Status: DISCONTINUED | OUTPATIENT
Start: 2024-10-22 | End: 2024-10-24

## 2024-10-22 RX ORDER — HYDROCORTISONE 1 %
1 OINTMENT (GRAM) TOPICAL EVERY 6 HOURS
Refills: 0 | Status: DISCONTINUED | OUTPATIENT
Start: 2024-10-22 | End: 2024-10-24

## 2024-10-22 RX ORDER — BENZOCAINE 200 MG/G
1 GEL ORAL EVERY 6 HOURS
Refills: 0 | Status: DISCONTINUED | OUTPATIENT
Start: 2024-10-22 | End: 2024-10-24

## 2024-10-22 RX ORDER — LEVOTHYROXINE SODIUM 88 MCG
75 TABLET ORAL DAILY
Refills: 0 | Status: DISCONTINUED | OUTPATIENT
Start: 2024-10-22 | End: 2024-10-24

## 2024-10-22 RX ORDER — DIPHENHYDRAMINE HCL 12.5MG/5ML
25 ELIXIR ORAL EVERY 6 HOURS
Refills: 0 | Status: DISCONTINUED | OUTPATIENT
Start: 2024-10-22 | End: 2024-10-24

## 2024-10-22 RX ADMIN — KETOROLAC TROMETHAMINE 30 MILLIGRAM(S): 30 INJECTION INTRAMUSCULAR; INTRAVENOUS at 17:31

## 2024-10-22 RX ADMIN — Medication 167 MILLIUNIT(S)/MIN: at 17:00

## 2024-10-22 RX ADMIN — Medication 125 MILLILITER(S): at 12:00

## 2024-10-22 RX ADMIN — Medication 125 MILLILITER(S): at 13:00

## 2024-10-22 RX ADMIN — Medication 600 MILLIGRAM(S): at 23:38

## 2024-10-22 RX ADMIN — Medication 10 UNIT(S): at 17:00

## 2024-10-22 NOTE — OB RN DELIVERY SUMMARY - NS_BREASTINHOURA_OBGYN_ALL_OB
Discharge Note:    Patient denies suicidal / homicidal intent or plan. Patient denies auditory / visual hallucinations. Patient mood stable and calm. Appropriate affect with good eye contact. Patient alert and oriented to all spheres. Patient denies pain at this time and is observed to be in no current distress at time of discharge. Patient agrees to seek emergency treatment before acting on dangerous impulses.      Discharge instructions and medications reviewed with patient.  Home medications returned to patient. Prescriptions given to patient at time of discharge. Treatment plans closed out with objectives met. All belonging returned to patient. Patient leaving the unit ambulatory by self or with a staff member to escort hm to the main entrance. Patient provided with bus passes.     Patient to continue after care at an local sober living ([facility]       Offered, feeding was successful

## 2024-10-22 NOTE — OB RN PATIENT PROFILE - TEMPERATURE IN FAHRENHEIT (DEGREES F)
Bren[place] with [facility]  stated that patients resting heart rate has been between 112-120 --left about 40 minutes ago no chest pain, no sob , dizziness with transitional movements. Please review and advise.    97.7

## 2024-10-22 NOTE — OB RN PATIENT PROFILE - NSSDOHTRANSPORT_OBGYN_A_OB
Detail Level: Generalized Detail Level: Zone Moisturizer Recommendations: Amlactin cream daily Detail Level: Simple Detail Level: Detailed no

## 2024-10-22 NOTE — OB PROVIDER H&P - ASSESSMENT
A/P :23yo  @ 40.3 weeks in labor  - Admit to L&D  - Routine labs   - EFM/TOCO  - Clear liquid diet  - IVH  - Anesthesia consult   - Bicitra  - Expectnt mgmt at this time  - Expect     dw Dr Good  LCavalieri PAC

## 2024-10-22 NOTE — OB RN DELIVERY SUMMARY - NS_SEPSISRSKCALC_OBGYN_ALL_OB_FT
EOS calculated successfully. EOS Risk Factor: 0.5/1000 live births ([facility] national incidence); GA=40w3d; Temp=98.6; ROM=3.417; GBS='Negative'; Antibiotics='No antibiotics or any antibiotics < 2 hrs prior to birth'

## 2024-10-22 NOTE — OB NEONATOLOGY/PEDIATRICIAN DELIVERY SUMMARY - NSPEDSNEONOTESA_OBGYN_ALL_OB_FT
Baby boy born cephalic at 40 3/7 wks via VAVD to a 25 y/o  mother who is A+ blood type, HBsAg neg, hep C neg, HIV neg, rubella immune, RPR neg, GBS neg as of . Maternal history of hypothyroid on synthroid. No significant prenatal history. SROM at 1330 with clear fluids. Baby emerged vigorous, crying, was w/d/s/s with APGARS of 9/9. Mom would like to breast feed, consents to the birth dose of Hep B and declines circ.
yes

## 2024-10-22 NOTE — OB RN DELIVERY SUMMARY - NSSELHIDDEN_OBGYN_ALL_OB_FT
[[email or web address]_ALL_OB_FT:[email or web address]],[NS_DeliveryRN_OBGYN_ALL_OB_FT:[id number]]

## 2024-10-22 NOTE — OB PROVIDER H&P - NSHPPHYSICALEXAM_GEN_ALL_CORE
PE:  T(C): --  HR: 84 (10-22-24 @ 12:14) (84 - 100)  BP: 116/82 (10-22-24 @ 12:07) (116/82 - 116/82)  RR: --  SpO2: 97% (10-22-24 @ 12:14) (94% - 100%)  General: NAD, A&Ox3  CV: RRR  Lungs: Clear bilat   Abd: soft, nontender, gravid  VE: 3/80/-3  EFM: 130/moderate variablity/+accels/-decels  TOCO: q3min  BSUS: vertex

## 2024-10-22 NOTE — OB PROVIDER H&P - HISTORY OF PRESENT ILLNESS
25yo  @ 40.3 weeks (OK 10/19) presents in labor. Pregnancy course complicated by hyperemesis early in pregnancy for which pt was admitted to antepartum. +FM, -LOF, -VB    GBS negative  EFW 3300    OBHx:  FT  7#2  GYNHx: No ovarian cysts, fibroids, hx of abnormal pap or STDs  PMHx: Hypothyroid, spinal syrinx (had epi with last) No hx of DM, HTN, asthma, bleeding or clotting disorders or blood transfusions.  PSurgHx: None  Allergies: NKDA  Meds: Iron, synthroid 75mcg  Social: No smoking, alcohol, or illicit drug use during pregnancy   Psych: No hx of anxiety or depression

## 2024-10-22 NOTE — OB NEONATOLOGY/PEDIATRICIAN DELIVERY SUMMARY - NSASSISTEDDELIVA _OBGYN_ALL_OB
Contacted [facility] pathology department regarding Cancer Type ID results. Spoke with Chetan Martinez, she stated they just sent it out yesterday and the results can take about 1 week to come back . Nelida Barron  RN, ADN, BSE, OCN  Patient Nurse Navigator
Vacuum

## 2024-10-22 NOTE — OB PROVIDER DELIVERY SUMMARY - NSPROVIDERDELIVERYNOTE_OBGYN_ALL_OB_FT
vavd pt delivered of a viable male infant apgar 9/9, placentsa complete and uterus explored. small MLE excised. vacuum done from OA+2 over one contracrion secondary to NRFHT. ebl 200 cc.

## 2024-10-23 ENCOUNTER — TRANSCRIPTION ENCOUNTER (OUTPATIENT)
Age: 24
End: 2024-10-23

## 2024-10-23 RX ORDER — IBUPROFEN 200 MG
1 TABLET ORAL
Qty: 0 | Refills: 0 | DISCHARGE
Start: 2024-10-23

## 2024-10-23 RX ORDER — ACETAMINOPHEN 500 MG
3 TABLET ORAL
Qty: 0 | Refills: 0 | DISCHARGE
Start: 2024-10-23

## 2024-10-23 RX ADMIN — Medication 975 MILLIGRAM(S): at 04:01

## 2024-10-23 RX ADMIN — Medication 975 MILLIGRAM(S): at 09:20

## 2024-10-23 RX ADMIN — Medication 75 MICROGRAM(S): at 06:08

## 2024-10-23 RX ADMIN — Medication 975 MILLIGRAM(S): at 03:31

## 2024-10-23 RX ADMIN — Medication 975 MILLIGRAM(S): at 09:50

## 2024-10-23 RX ADMIN — Medication 600 MILLIGRAM(S): at 12:13

## 2024-10-23 RX ADMIN — Medication 600 MILLIGRAM(S): at 06:08

## 2024-10-23 RX ADMIN — Medication 600 MILLIGRAM(S): at 18:09

## 2024-10-23 RX ADMIN — Medication 600 MILLIGRAM(S): at 18:39

## 2024-10-23 RX ADMIN — Medication 975 MILLIGRAM(S): at 21:08

## 2024-10-23 RX ADMIN — Medication 975 MILLIGRAM(S): at 16:06

## 2024-10-23 RX ADMIN — Medication 975 MILLIGRAM(S): at 15:36

## 2024-10-23 RX ADMIN — Medication 600 MILLIGRAM(S): at 00:08

## 2024-10-23 RX ADMIN — Medication 600 MILLIGRAM(S): at 11:43

## 2024-10-23 RX ADMIN — Medication 975 MILLIGRAM(S): at 20:38

## 2024-10-23 NOTE — DISCHARGE NOTE OB - FINANCIAL ASSISTANCE
[facility] provides services at a reduced cost to those who are determined to be eligible through [facility]’s financial assistance program. Information regarding [facility]’s financial assistance program can be found by going to [email or web address] or by calling [phone].

## 2024-10-23 NOTE — DISCHARGE NOTE OB - SWOLLEN, PAINFUL HOT AREAS AND/OR STREAKS ON THE BREAST
Upper Endoscopy   WHAT YOU NEED TO KNOW:   An upper endoscopy is also called an upper gastrointestinal (GI) endoscopy, or an esophagogastroduodenoscopy (EGD)  You may feel bloated, gassy, or have some abdominal discomfort after your procedure  Your throat may be sore for 24 to 36 hours  You may burp or pass gas from air that is still inside your body  DISCHARGE INSTRUCTIONS:   Call 911 for any of the following:   · You have sudden chest pain or trouble breathing  Seek care immediately if:   · You feel dizzy or faint  · You have trouble swallowing  · Your bowel movements are very dark or black  · Your abdomen is hard and firm and you have severe pain  · You vomit blood  Contact your healthcare provider if:   · You feel full or bloated and cannot burp or pass gas  · You have not had a bowel movement for 3 days after your procedure  · You have neck pain  · You have a fever or chills  · You have nausea or are vomiting  · You have a rash or hives  · You have questions or concerns about your endoscopy  Relieve a sore throat:  Suck on throat lozenges or crushed ice  Gargle with a small amount of warm salt water  Mix 1 teaspoon of salt and 1 cup of warm water to make salt water  Relieve gas and discomfort from bloating:  Lie on your right side with a heating pad on your abdomen  Take short walks to help pass gas  Eat small meals until bloating is relieved  Rest after your procedure: You have been given medicine to relax you  Do not  drive or make important decisions until the day after your procedure  Return to your normal activity as directed  You can usually return to work the day after your procedure  Follow up with your healthcare provider as directed:  Write down your questions so you remember to ask them during your visits     © 2017 [address] is for End User's use only and may not be sold, redistributed or otherwise used for commercial purposes  All illustrations and images included in CareNotes® are the copyrighted property of [employer]  or [employer]  The above information is an  only  It is not intended as medical advice for individual conditions or treatments  Talk to your doctor, nurse or pharmacist before following any medical regimen to see if it is safe and effective for you  Statement Selected

## 2024-10-23 NOTE — DISCHARGE NOTE OB - CARE PLAN
Principal Discharge DX:	 (normal spontaneous vaginal delivery)  Assessment and plan of treatment:	Return to baseline health, regular diet, pain control. Follow up with Dr. Good.   1

## 2024-10-23 NOTE — DISCHARGE NOTE OB - PATIENT PORTAL LINK FT
You can access the FollowMyHealth Patient Portal offered by [facility] by registering at the following website: [email or web address]. By joining [employer]’s FollowMyHealth portal, you will also be able to view your health information using other applications (apps) compatible with our system.

## 2024-10-23 NOTE — DISCHARGE NOTE OB - CARE PROVIDER_API CALL
Demetra Good  Obstetrics and Gynecology  [address], [address]  [place] [address]  Phone: [phone]  Fax: [phone]  Follow Up Time:

## 2024-10-23 NOTE — PROGRESS NOTE ADULT - ASSESSMENT
A/P: 23yo PPD#1 s/p VAVD.  Patient is stable and doing well post-partum.   #Postpartum   -   - H/H: 11.5/35  - Pain well controlled, continue current pain regimen  - Increase ambulation, SCDs when not ambulating  - Continue regular diet    Marybel Jamil, PGY1

## 2024-10-23 NOTE — PROGRESS NOTE ADULT - SUBJECTIVE AND OBJECTIVE BOX
OB Progress Note: VAVD PPD#1    S: 23yo  PPD#1 s/p VAVD w/ small MLE excised. Patient feels well. Pain is well controlled. She is tolerating a regular diet and passing flatus. She is voiding spontaneously, and ambulating without difficulty. Denies CP/SOB. Denies lightheadedness/dizziness. Denies N/V.    O:  Vitals:  Vital Signs Last 24 Hrs  T(C): 36.3 (23 Oct 2024 05:30), Max: 37 (22 Oct 2024 17:12)  T(F): 97.4 (23 Oct 2024 05:30), Max: 98.6 (22 Oct 2024 17:12)  HR: 77 (23 Oct 2024 05:30) (68 - 143)  BP: 114/75 (23 Oct 2024 05:30) (98/51 - 134/82)  BP(mean): 83 (22 Oct 2024 20:30) (83 - 83)  RR: 18 (23 Oct 2024 05:30) (16 - 18)  SpO2: 96% (23 Oct 2024 05:30) (92% - 100%)    Parameters below as of 23 Oct 2024 05:30  Patient On (Oxygen Delivery Method): room air        MEDICATIONS  (STANDING):  acetaminophen     Tablet .. 975 milliGRAM(s) Oral <User Schedule>  diphtheria/tetanus/pertussis (acellular) Vaccine (Adacel) 0.5 milliLiter(s) IntraMuscular once  ibuprofen  Tablet. 600 milliGRAM(s) Oral every 6 hours  levothyroxine 75 MICROGram(s) Oral daily  oxytocin Infusion 167 milliUNIT(s)/Min (167 mL/Hr) IV Continuous <Continuous>  oxytocin Infusion 167 milliUNIT(s)/Min (167 mL/Hr) IV Continuous <Continuous>  prenatal multivitamin 1 Tablet(s) Oral daily  sodium chloride 0.9% lock flush 3 milliLiter(s) IV Push every 8 hours      Labs:  Blood type: A Positive  Rubella IgG: RPR: Negative                          11.5   16.53[H] >-----------< 228    ( 10-22 @ 13:51 )             35.0          Physical Exam:  General: NAD  Pulm: breathing comfortably on room air   Abdomen: soft, non-tender, non-distended, fundus firm  Vaginal: Lochia wnl  Extremities: No erythema/edema

## 2024-10-23 NOTE — DISCHARGE NOTE OB - MEDICATION SUMMARY - MEDICATIONS TO TAKE
I will START or STAY ON the medications listed below when I get home from the hospital:    ibuprofen 600 mg oral tablet  -- 1 tab(s) by mouth every 6 hours as needed for cramping  -- Indication: For cramping    acetaminophen 325 mg oral tablet  -- 3 tab(s) by mouth every 6 hours as needed for  mild pain  -- Indication: For mild pain

## 2024-10-24 VITALS
TEMPERATURE: 98 F | DIASTOLIC BLOOD PRESSURE: 74 MMHG | OXYGEN SATURATION: 97 % | HEART RATE: 71 BPM | RESPIRATION RATE: 18 BRPM | SYSTOLIC BLOOD PRESSURE: 116 MMHG

## 2024-10-24 PROCEDURE — 85025 COMPLETE CBC W/AUTO DIFF WBC: CPT

## 2024-10-24 PROCEDURE — 86850 RBC ANTIBODY SCREEN: CPT

## 2024-10-24 PROCEDURE — 86900 BLOOD TYPING SEROLOGIC ABO: CPT

## 2024-10-24 PROCEDURE — 86901 BLOOD TYPING SEROLOGIC RH(D): CPT

## 2024-10-24 PROCEDURE — 86780 TREPONEMA PALLIDUM: CPT

## 2024-10-24 RX ADMIN — Medication 75 MICROGRAM(S): at 05:45

## 2024-10-24 RX ADMIN — Medication 975 MILLIGRAM(S): at 08:55

## 2024-10-24 RX ADMIN — Medication 600 MILLIGRAM(S): at 00:43

## 2024-10-24 RX ADMIN — Medication 600 MILLIGRAM(S): at 05:44

## 2024-10-24 RX ADMIN — Medication 975 MILLIGRAM(S): at 09:25

## 2024-10-24 RX ADMIN — Medication 600 MILLIGRAM(S): at 01:13

## 2025-07-05 NOTE — OB RN PATIENT PROFILE - BSA (M2)
Piot, Sunita, LPN   Licensed Nurse     Progress Notes     Signed     Encounter Date: 7/1/2025       Phone BP check. Pt takes Losartan 100 mg in the am .        ,   HOME READINGS      6/15  /72   Pulse 87  6/22  BP  127/71  Pulse 82  6/23  / 79    Pulse 82  6/25  /82  Pulse 86  6/27  /93  Pulse 88  6/29  /85  Pulse 81   6/30  /82  Pulse 72                  Blood pressure reading  today  was 134/78, Pulse 81.  Dr. Hobson notified.     Jermaine GRACIA Henderson 66 y.o. male is here today for Blood Pressure check.   History of HTN yes.     Review of patient's allergies indicates:  No Known Allergies        Creatinine   Date Value Ref Range Status   05/09/2025 0.9 0.5 - 1.4 mg/dL Final            Sodium   Date Value Ref Range Status   05/09/2025 138 136 - 145 mmol/L Final   02/29/2024 138 136 - 145 mmol/L Final            Potassium   Date Value Ref Range Status   05/09/2025 4.1 3.5 - 5.1 mmol/L Final   02/29/2024 4.8 3.5 - 5.1 mmol/L Final   ]  Patient verifies taking blood pressure medications on a regular basis at the same time of the day.   [Current Medications]    [Current Medications]     Current Outpatient Medications:     acyclovir (ZOVIRAX) 400 MG tablet, Take 400 mg by mouth Daily., Disp: , Rfl:     ergocalciferol, vitamin D2, (VITAMIN D ORAL), Take by mouth Daily., Disp: , Rfl:     ibuprofen 200 mg Cap, 200 mg as needed., Disp: , Rfl:     losartan (COZAAR) 100 MG tablet, TAKE 1 TABLET BY MOUTH EVERY DAY, Disp: 90 tablet, Rfl: 1    pantoprazole (PROTONIX) 40 MG tablet, Take 1 tablet (40 mg total) by mouth once daily., Disp: 90 tablet, Rfl: 3    rosuvastatin (CRESTOR) 10 MG tablet, Take 1 tablet (10 mg total) by mouth once daily., Disp: 90 tablet, Rfl: 3    sildenafiL (VIAGRA) 100 MG tablet, Take 1 tablet (100 mg total) by mouth daily as needed for Erectile Dysfunction., Disp: 16 tablet, Rfl: 1     Current Facility-Administered Medications:     EPINEPHrine (EPIPEN) 0.3 mg/0.3 mL pen  injection 0.3 mg, 0.3 mg, Intramuscular, PRN, Chrystal Hobson MD  Does patient have record of home blood pressure readings yes.   Last dose of blood pressure medication was taken at  6:00 am this am .  Patient is asymptomatic.                Electronically signed by Sunita Gupta LPN at 7/1/2025  9:39 AM   1.61